# Patient Record
Sex: MALE | Race: BLACK OR AFRICAN AMERICAN | Employment: FULL TIME | URBAN - METROPOLITAN AREA
[De-identification: names, ages, dates, MRNs, and addresses within clinical notes are randomized per-mention and may not be internally consistent; named-entity substitution may affect disease eponyms.]

---

## 2017-09-29 ENCOUNTER — OFFICE VISIT (OUTPATIENT)
Dept: INTERNAL MEDICINE CLINIC | Age: 27
End: 2017-09-29

## 2017-09-29 VITALS
SYSTOLIC BLOOD PRESSURE: 120 MMHG | OXYGEN SATURATION: 97 % | DIASTOLIC BLOOD PRESSURE: 75 MMHG | RESPIRATION RATE: 16 BRPM | HEART RATE: 80 BPM | BODY MASS INDEX: 24.37 KG/M2 | TEMPERATURE: 98 F | WEIGHT: 155.3 LBS | HEIGHT: 67 IN

## 2017-09-29 DIAGNOSIS — Z00.00 PHYSICAL EXAM: Primary | ICD-10-CM

## 2017-09-29 DIAGNOSIS — F90.9 ATTENTION DEFICIT HYPERACTIVITY DISORDER (ADHD), UNSPECIFIED ADHD TYPE: ICD-10-CM

## 2017-09-29 NOTE — PROGRESS NOTES
Chief Complaint   Patient presents with    Complete Physical     (RM 9)    LOW BACK PAIN    Exposure to STD

## 2017-09-29 NOTE — MR AVS SNAPSHOT
Visit Information Date & Time Provider Department Dept. Phone Encounter #  
 9/29/2017 12:15 PM Harvey Cabrera MD [facility] [phone] [id number] Upcoming Health Maintenance Date Due Pneumococcal 19-64 Medium Risk (1 of 1 - PPSV23) 11/12/2010 DTaP/Tdap/Td series (1 - Tdap) 11/12/2012 Allergies as of 9/29/2017  Review Complete On: 9/29/2017 By: Bhanu Raya No Known Allergies Current Immunizations  Never Reviewed No immunizations on file. Not reviewed this visit You Were Diagnosed With   
  
 Codes Comments Physical exam    -  Primary ICD-10-CM: Z00.00 ICD-9-CM: V70.9 Attention deficit hyperactivity disorder (ADHD), unspecified ADHD type     ICD-10-CM: F90.9 ICD-9-CM: 314.01 Vitals BP Pulse Temp Resp Height(growth percentile) Weight(growth percentile) 120/75 80 98 °F (36.7 °C) (Oral) 16 5' 7\" (1.702 m) 155 lb 4.8 oz (70.4 kg) SpO2 BMI Smoking Status 97% 24.32 kg/m2 Current Every Day Smoker BMI and BSA Data Body Mass Index Body Surface Area  
 24.32 kg/m 2 1.82 m 2 Preferred Pharmacy Pharmacy Name Phone [facility] [address] [place] [address]. [address] [phone] Your Updated Medication List  
  
   
This list is accurate as of: 9/29/17  3:07 PM.  Always use your most recent med list.  
  
  
  
  
 ADDERALL 20 mg tablet Generic drug:  dextroamphetamine-amphetamine Take 20 mg by mouth three (3) times daily. XANAX 2 mg tablet Generic drug:  ALPRAZolam  
Take 2 mg by mouth daily. We Performed the Following AMB POC EKG ROUTINE W/ 12 LEADS, INTER & REP [29973 CPT(R)] CBC WITH AUTOMATED DIFF [21065 CPT(R)] HEPATITIS PANEL, ACUTE [89193 CPT(R)] HIV 1/2 AG/AB, 4TH GENERATION,W RFLX CONFIRM [EAD21806 Custom] LIPID PANEL [23580 CPT(R)] METABOLIC PANEL, COMPREHENSIVE [39001 CPT(R)] MT COLLECTION VENOUS BLOOD,VENIPUNCTURE [id number] CPT(R)] URINALYSIS W/ RFLX MICROSCOPIC [43052 CPT(R)] Introducing [facility]! [employer] introduces [employer] patient portal. Now you can access parts of your medical record, email your doctor's office, and request medication refills online. 1. In your internet browser, go to [email or web address] 2. Click on the First Time User? Click Here link in the Sign In box. You will see the New Member Sign Up page. 3. Enter your Sailthru Access Code exactly as it appears below. You will not need to use this code after youve completed the sign-up process. If you do not sign up before the expiration date, you must request a new code. · Sailthru Access Code: [email or web address] Expires: 12/28/2017  3:07 PM 
 
4. Enter the last four digits of your Social Security Number (xxxx) and Date of Birth (mm/dd/yyyy) as indicated and click Submit. You will be taken to the next sign-up page. 5. Create a Sailthru ID. This will be your Sailthru login ID and cannot be changed, so think of one that is secure and easy to remember. 6. Create a Sailthru password. You can change your password at any time. 7. Enter your Password Reset Question and Answer. This can be used at a later time if you forget your password. 8. Enter your e-mail address. You will receive e-mail notification when new information is available in [address] 9. Click Sign Up. You can now view and download portions of your medical record. 10. Click the Download Summary menu link to download a portable copy of your medical information. If you have questions, please visit the Frequently Asked Questions section of the Sailthru website. Remember, Sailthru is NOT to be used for urgent needs. For medical emergencies, dial 911. Now available from your iPhone and Android! Please provide this summary of care documentation to your next provider. Your primary care clinician is listed as Johny Altamirano. If you have any questions after today's visit, please call [phone].

## 2017-09-29 NOTE — PROGRESS NOTES
[address] [facility]  [place] [address]  [address] [facility] [address]  Phone:  [phone]  Fax: [phone]       Chief Complaint   Patient presents with    Complete Physical          LOW BACK PAIN    Exposure to STD   . SUBJECTIVE:    Ranulfo Montiel is a 23 y.o. male Comes in for his yearly physical primarily for boxing. He offers no complaints. He does have attention deficit disorder, but has not been taking any medications for this because he is not in school for this semester. Current Outpatient Prescriptions   Medication Sig Dispense Refill    ALPRAZolam (XANAX) 2 mg tablet Take 2 mg by mouth daily.  dextroamphetamine-amphetamine (ADDERALL) 20 mg tablet Take 20 mg by mouth three (3) times daily. Past Medical History:   Diagnosis Date    ADHD (attention deficit hyperactivity disorder)      History reviewed. No pertinent surgical history.   No Known Allergies      REVIEW OF SYSTEMS:  General: negative for - chills or fever  ENT: negative for - headaches, nasal congestion or tinnitus  Respiratory: negative for - cough, hemoptysis, shortness of breath or wheezing  Cardiovascular : negative for - chest pain, edema, palpitations or shortness of breath  Gastrointestinal: negative for - abdominal pain, blood in stools, heartburn or nausea/vomiting  Genito-Urinary: no dysuria, trouble voiding, or hematuria  Musculoskeletal: negative for - gait disturbance, joint pain, joint stiffness or joint swelling  Neurological: no TIA or stroke symptoms  Hematologic: no bruises, no bleeding, no swollen glands  Integument: no lumps, mole changes, nail changes or rash  Endocrine: no malaise/lethargy or unexpected weight changes      Social History     Social History    Marital status: SINGLE     Spouse name: N/A    Number of children: N/A    Years of education: N/A     Social History Main Topics    Smoking status: Current Every Day Smoker     Packs/day: 0.50 Years: 1.00    Smokeless tobacco: Never Used    Alcohol use No    Drug use: No    Sexual activity: Yes     Partners: Female     Other Topics Concern    None     Social History Narrative     Family History   Problem Relation Age of Onset    Diabetes Maternal Grandmother        OBJECTIVE:    Visit Vitals    /75    Pulse 80    Temp 98 °F (36.7 °C) (Oral)    Resp 16    Ht 5' 7\" (1.702 m)    Wt 155 lb 4.8 oz (70.4 kg)    SpO2 97%    BMI 24.32 kg/m2     CONSTITUTIONAL: well , well nourished, appears age appropriate  EYES: perrla, eom intact  ENMT:moist mucous membranes, pharynx clear  NECK: supple. Thyroid normal  RESPIRATORY: Chest: clear to ascultation and percussion   CARDIOVASCULAR: Heart: regular rate and rhythm  GASTROINTESTINAL: Abdomen: soft, bowel sounds active  HEMATOLOGIC: no pathological lymph nodes palpated  MUSCULOSKELETAL: Extremities: no edema, pulse 1+   INTEGUMENT: No unusual rashes or suspicious skin lesions noted. Nails appear normal.  NEUROLOGIC: non-focal exam   MENTAL STATUS: alert and oriented, appropriate affect  Genitalia: Normal male and no hernias      ASSESSMENT:  1. Physical exam    2. Attention deficit hyperactivity disorder (ADHD), unspecified ADHD type        PLAN:    1. The patient is quite healthy. 2. He is not on Adderall for now. 3. His paperwork will be completed once lab results have returned. .  Orders Placed This Encounter    CBC WITH AUTOMATED DIFF    LIPID PANEL    METABOLIC PANEL, COMPREHENSIVE    URINALYSIS W/ RFLX MICROSCOPIC    HIV 1/2 AG/AB, 4TH GENERATION,W RFLX CONFIRM    HEPATITIS PANEL, ACUTE    AMB POC EKG ROUTINE W/ 12 LEADS, INTER & REP         Follow-up Disposition:  Return in about 1 year (around 9/29/2018).       Katheryn Johnson MD

## 2017-09-30 LAB
ALBUMIN SERPL-MCNC: 4.9 G/DL (ref 3.5–5.5)
ALBUMIN/GLOB SERPL: 2 {RATIO} (ref 1.2–2.2)
ALP SERPL-CCNC: 49 IU/L (ref 39–117)
ALT SERPL-CCNC: 14 IU/L (ref 0–44)
APPEARANCE UR: CLEAR
AST SERPL-CCNC: 25 IU/L (ref 0–40)
BASOPHILS # BLD AUTO: 0 X10E3/UL (ref 0–0.2)
BASOPHILS NFR BLD AUTO: 0 %
BILIRUB SERPL-MCNC: 0.7 MG/DL (ref 0–1.2)
BILIRUB UR QL STRIP: NEGATIVE
BUN SERPL-MCNC: 19 MG/DL (ref 6–20)
BUN/CREAT SERPL: 19 (ref 9–20)
CALCIUM SERPL-MCNC: 9.5 MG/DL (ref 8.7–10.2)
CHLORIDE SERPL-SCNC: 105 MMOL/L (ref 96–106)
CHOLEST SERPL-MCNC: 163 MG/DL (ref 100–199)
CO2 SERPL-SCNC: 20 MMOL/L (ref 18–29)
COLOR UR: YELLOW
CREAT SERPL-MCNC: 1.01 MG/DL (ref 0.76–1.27)
EOSINOPHIL # BLD AUTO: 0.5 X10E3/UL (ref 0–0.4)
EOSINOPHIL NFR BLD AUTO: 8 %
ERYTHROCYTE [DISTWIDTH] IN BLOOD BY AUTOMATED COUNT: 13.5 % (ref 12.3–15.4)
GLOBULIN SER CALC-MCNC: 2.5 G/DL (ref 1.5–4.5)
GLUCOSE SERPL-MCNC: 76 MG/DL (ref 65–99)
GLUCOSE UR QL: NEGATIVE
HAV IGM SERPL QL IA: NEGATIVE
HBV CORE IGM SERPL QL IA: NEGATIVE
HBV SURFACE AG SERPL QL IA: NEGATIVE
HCT VFR BLD AUTO: 39.2 % (ref 37.5–51)
HCV AB S/CO SERPL IA: <0.1 S/CO RATIO (ref 0–0.9)
HDLC SERPL-MCNC: 72 MG/DL
HGB BLD-MCNC: 12.7 G/DL (ref 12.6–17.7)
HGB UR QL STRIP: NEGATIVE
HIV 1+2 AB+HIV1 P24 AG SERPL QL IA: NON REACTIVE
IMM GRANULOCYTES # BLD: 0 X10E3/UL (ref 0–0.1)
IMM GRANULOCYTES NFR BLD: 0 %
KETONES UR QL STRIP: NEGATIVE
LDLC SERPL CALC-MCNC: 82 MG/DL (ref 0–99)
LEUKOCYTE ESTERASE UR QL STRIP: NEGATIVE
LYMPHOCYTES # BLD AUTO: 1.8 X10E3/UL (ref 0.7–3.1)
LYMPHOCYTES NFR BLD AUTO: 29 %
MCH RBC QN AUTO: 29.7 PG (ref 26.6–33)
MCHC RBC AUTO-ENTMCNC: 32.4 G/DL (ref 31.5–35.7)
MCV RBC AUTO: 92 FL (ref 79–97)
MICRO URNS: ABNORMAL
MONOCYTES # BLD AUTO: 0.8 X10E3/UL (ref 0.1–0.9)
MONOCYTES NFR BLD AUTO: 12 %
NEUTROPHILS # BLD AUTO: 3.1 X10E3/UL (ref 1.4–7)
NEUTROPHILS NFR BLD AUTO: 51 %
NITRITE UR QL STRIP: NEGATIVE
PH UR STRIP: 6 [PH] (ref 5–7.5)
PLATELET # BLD AUTO: 347 X10E3/UL (ref 150–379)
POTASSIUM SERPL-SCNC: 4.6 MMOL/L (ref 3.5–5.2)
PROT SERPL-MCNC: 7.4 G/DL (ref 6–8.5)
PROT UR QL STRIP: NEGATIVE
RBC # BLD AUTO: 4.28 X10E6/UL (ref 4.14–5.8)
SODIUM SERPL-SCNC: 143 MMOL/L (ref 134–144)
SP GR UR: >=1.03 (ref 1–1.03)
TRIGL SERPL-MCNC: 46 MG/DL (ref 0–149)
UROBILINOGEN UR STRIP-MCNC: 0.2 MG/DL (ref 0.2–1)
VLDLC SERPL CALC-MCNC: 9 MG/DL (ref 5–40)
WBC # BLD AUTO: 6.2 X10E3/UL (ref 3.4–10.8)

## 2017-10-01 NOTE — PROGRESS NOTES
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

## 2017-10-03 ENCOUNTER — TELEPHONE (OUTPATIENT)
Dept: INTERNAL MEDICINE CLINIC | Age: 27
End: 2017-10-03

## 2017-10-03 NOTE — TELEPHONE ENCOUNTER
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

## 2017-12-14 ENCOUNTER — OFFICE VISIT (OUTPATIENT)
Dept: INTERNAL MEDICINE CLINIC | Age: 27
End: 2017-12-14

## 2017-12-14 VITALS
HEART RATE: 73 BPM | OXYGEN SATURATION: 96 % | HEIGHT: 67 IN | SYSTOLIC BLOOD PRESSURE: 112 MMHG | TEMPERATURE: 98.3 F | RESPIRATION RATE: 20 BRPM | WEIGHT: 156.7 LBS | BODY MASS INDEX: 24.6 KG/M2 | DIASTOLIC BLOOD PRESSURE: 73 MMHG

## 2017-12-14 DIAGNOSIS — T14.8XXA BRUISE: ICD-10-CM

## 2017-12-14 DIAGNOSIS — K52.9 GASTROENTERITIS: Primary | ICD-10-CM

## 2017-12-14 NOTE — PROGRESS NOTES
Chief Complaint   Patient presents with    Neck Injury     patient states that he boxes and thinks that he may have injured his neck. he also states that he thinks that he may have gotten food poisoning the next day. 1. Have you been to the ER, urgent care clinic since your last visit? Hospitalized since your last visit? No    2. Have you seen or consulted any other health care providers outside of the [address] [place] [address] since your last visit? Include any pap smears or colon screening.  No

## 2017-12-14 NOTE — MR AVS SNAPSHOT
Visit Information Date & Time Provider Department Dept. Phone Encounter #  
 12/14/2017  3:45 PM Christine Castaneda [facility] Sports Medicine and Primary Care [phone] [id number] Your Appointments 12/28/2017  9:00 AM  
Any with Romie Whiting MD  
[address] [place] [address] and Primary [facility] Appt Note: 2 week follow up  
 Danny Gomez [address] [facility]  
  
   
 Danny [address] [id number] Upcoming Health Maintenance Date Due Pneumococcal 19-64 Medium Risk (1 of 1 - PPSV23) 11/12/2010 DTaP/Tdap/Td series (1 - Tdap) 11/12/2012 Allergies as of 12/14/2017  Review Complete On: 12/14/2017 By: Penny Barber No Known Allergies Current Immunizations  Never Reviewed No immunizations on file. Not reviewed this visit Vitals BP Pulse Temp Resp Height(growth percentile) Weight(growth percentile) 112/73 (BP 1 Location: Left arm, BP Patient Position: Sitting) 73 98.3 °F (36.8 °C) (Oral) 20 5' 7\" (1.702 m) 156 lb 11.2 oz (71.1 kg) SpO2 BMI Smoking Status 96% 24.54 kg/m2 Current Every Day Smoker Vitals History BMI and BSA Data Body Mass Index Body Surface Area 24.54 kg/m 2 1.83 m 2 Preferred Pharmacy Pharmacy Name Phone [facility] [address] [facility] [phone] Your Updated Medication List  
  
   
This list is accurate as of: 12/14/17  4:34 PM.  Always use your most recent med list.  
  
  
  
  
 ADDERALL 20 mg tablet Generic drug:  dextroamphetamine-amphetamine Take 20 mg by mouth three (3) times daily. XANAX 2 mg tablet Generic drug:  ALPRAZolam  
Take 2 mg by mouth daily. Introducing [facility]!    
 [employer] introduces [employer] patient portal. Now you can access parts of your medical record, email your doctor's office, and request medication refills online. 1. In your internet browser, go to [email or web address] 2. Click on the First Time User? Click Here link in the Sign In box. You will see the New Member Sign Up page. 3. Enter your [employer] Access Code exactly as it appears below. You will not need to use this code after youve completed the sign-up process. If you do not sign up before the expiration date, you must request a new code. · [employer] Access Code: [email or web address] Expires: 12/28/2017  2:07 PM 
 
4. Enter the last four digits of your Social Security Number (xxxx) and Date of Birth (mm/dd/yyyy) as indicated and click Submit. You will be taken to the next sign-up page. 5. Create a [employer] ID. This will be your [employer] login ID and cannot be changed, so think of one that is secure and easy to remember. 6. Create a [employer] password. You can change your password at any time. 7. Enter your Password Reset Question and Answer. This can be used at a later time if you forget your password. 8. Enter your e-mail address. You will receive e-mail notification when new information is available in [address] 9. Click Sign Up. You can now view and download portions of your medical record. 10. Click the Download Summary menu link to download a portable copy of your medical information. If you have questions, please visit the Frequently Asked Questions section of the [employer] website. Remember, [employer] is NOT to be used for urgent needs. For medical emergencies, dial 911. Now available from your iPhone and Android! Please provide this summary of care documentation to your next provider. Your primary care clinician is listed as Mc De Dios. If you have any questions after today's visit, please call [phone].

## 2017-12-16 PROBLEM — K52.9 GASTROENTERITIS: Status: ACTIVE | Noted: 2017-12-16

## 2017-12-16 PROBLEM — T14.8XXA BRUISE: Status: ACTIVE | Noted: 2017-12-16

## 2017-12-16 NOTE — PROGRESS NOTES
Chief Complaint   Patient presents with    Neck Injury     patient states that he boxes and thinks that he may have injured his neck. he also states that he thinks that he may have gotten food poisoning the next day. .      SUBECTIVE:    Wilbert Trinidad is a 33 y.o. male   Comes in stating that about 3-4 days ago he ate [employer] food that was possibly contaminated with shrimp. Within about six hours after doing so, he vomited on several occasions and had diarrhea. These symptoms have now improved significantly. He recalls a similar episode occurring about 4-6 months ago when he did have shrimp formally with an identical set of symptoms occurring. He also has pain in his right flank region which is where he was struck in his boxing match. This is improving. MedDATA/gwo         Current Outpatient Prescriptions   Medication Sig Dispense Refill    ALPRAZolam (XANAX) 2 mg tablet Take 2 mg by mouth daily.  dextroamphetamine-amphetamine (ADDERALL) 20 mg tablet Take 20 mg by mouth three (3) times daily. Past Medical History:   Diagnosis Date    ADHD (attention deficit hyperactivity disorder)      History reviewed. No pertinent surgical history.   No Known Allergies    REVIEW OF SYSTEMS:  Review of Systems - Negative except   ENT ROS: negative for - headaches, hearing change, nasal congestion, oral lesions, tinnitus, visual changes or   Respiratory ROS: no cough, shortness of breath, or wheezing  Cardiovascular ROS: no chest pain or dyspnea on exertion  Gastrointestinal ROS: no abdominal pain, change in bowel habits, or black or blood  Genito-Urinary ROS: no dysuria, trouble voiding, or hematuria  Musculoskeletal ROS: negative  Neurological ROS: no TIA or stroke symptoms      Social History     Social History    Marital status: SINGLE     Spouse name: N/A    Number of children: N/A    Years of education: N/A     Social History Main Topics    Smoking status: Current Every Day Smoker Packs/day: 0.50     Years: 1.00    Smokeless tobacco: Never Used    Alcohol use No    Drug use: No    Sexual activity: Yes     Partners: Female     Other Topics Concern    None     Social History Narrative   r  Family History   Problem Relation Age of Onset    Diabetes Maternal Grandmother        OBJECTIVE:  Visit Vitals    /73 (BP 1 Location: Left arm, BP Patient Position: Sitting)    Pulse 73    Temp 98.3 °F (36.8 °C) (Oral)    Resp 20    Ht 5' 7\" (1.702 m)    Wt 156 lb 11.2 oz (71.1 kg)    SpO2 96%    BMI 24.54 kg/m2     ENT: perrla,  eom intact  NECK: supple. Thyroid normal, no JVD  CHEST: clear to ascultation and percussion   HEART: regular rate and rhythm  ABD: soft, bowel sounds active,   EXTREMITIES: no edema, pulse 1+  INTEGUMENT: clear      ASSESSMENT:  1. Gastroenteritis    2. Bruise        PLAN:    Probably had food poisoning or contamination with the most likely culprit being shrimp. He will actively avoid this. No further treatment is indicated. His bruised ribs are improving. I explained to him this has absolutely nothing to do with his kidneys. MedDATA/gwo         Follow-up Disposition:  Return if symptoms worsen or fail to improve.       Anamika Vasquez MD

## 2022-02-28 ENCOUNTER — HOSPITAL ENCOUNTER (INPATIENT)
Age: 32
LOS: 5 days | Discharge: HOME | DRG: 753 | End: 2022-03-07
Attending: EMERGENCY MEDICINE | Admitting: PSYCHIATRY & NEUROLOGY
Payer: MEDICAID

## 2022-02-28 DIAGNOSIS — R45.1 AGITATION: Primary | ICD-10-CM

## 2022-02-28 DIAGNOSIS — R45.850 HOMICIDAL IDEATION: ICD-10-CM

## 2022-02-28 LAB
ALBUMIN SERPL-MCNC: 4.2 G/DL (ref 3.5–5)
ALBUMIN/GLOB SERPL: 1.2 {RATIO} (ref 1.1–2.2)
ALP SERPL-CCNC: 52 U/L (ref 45–117)
ALT SERPL-CCNC: 25 U/L (ref 12–78)
ANION GAP SERPL CALC-SCNC: 4 MMOL/L (ref 5–15)
APAP SERPL-MCNC: <2 UG/ML (ref 10–30)
AST SERPL-CCNC: 20 U/L (ref 15–37)
BASOPHILS # BLD: 0 K/UL (ref 0–0.1)
BASOPHILS NFR BLD: 1 % (ref 0–1)
BILIRUB SERPL-MCNC: 1.7 MG/DL (ref 0.2–1)
BUN SERPL-MCNC: 15 MG/DL (ref 6–20)
BUN/CREAT SERPL: 14 (ref 12–20)
CALCIUM SERPL-MCNC: 9.5 MG/DL (ref 8.5–10.1)
CHLORIDE SERPL-SCNC: 107 MMOL/L (ref 97–108)
CO2 SERPL-SCNC: 26 MMOL/L (ref 21–32)
CREAT SERPL-MCNC: 1.1 MG/DL (ref 0.7–1.3)
DIFFERENTIAL METHOD BLD: ABNORMAL
EOSINOPHIL # BLD: 0.4 K/UL (ref 0–0.4)
EOSINOPHIL NFR BLD: 6 % (ref 0–7)
ERYTHROCYTE [DISTWIDTH] IN BLOOD BY AUTOMATED COUNT: 13.9 % (ref 11.5–14.5)
ETHANOL SERPL-MCNC: <10 MG/DL
FLUAV RNA SPEC QL NAA+PROBE: NOT DETECTED
FLUBV RNA SPEC QL NAA+PROBE: NOT DETECTED
GLOBULIN SER CALC-MCNC: 3.4 G/DL (ref 2–4)
GLUCOSE SERPL-MCNC: 87 MG/DL (ref 65–100)
HCT VFR BLD AUTO: 37.5 % (ref 36.6–50.3)
HGB BLD-MCNC: 12.1 G/DL (ref 12.1–17)
IMM GRANULOCYTES # BLD AUTO: 0 K/UL (ref 0–0.04)
IMM GRANULOCYTES NFR BLD AUTO: 0 % (ref 0–0.5)
LYMPHOCYTES # BLD: 2.2 K/UL (ref 0.8–3.5)
LYMPHOCYTES NFR BLD: 29 % (ref 12–49)
MCH RBC QN AUTO: 30.7 PG (ref 26–34)
MCHC RBC AUTO-ENTMCNC: 32.3 G/DL (ref 30–36.5)
MCV RBC AUTO: 95.2 FL (ref 80–99)
MONOCYTES # BLD: 0.8 K/UL (ref 0–1)
MONOCYTES NFR BLD: 10 % (ref 5–13)
NEUTS SEG # BLD: 4.1 K/UL (ref 1.8–8)
NEUTS SEG NFR BLD: 54 % (ref 32–75)
NRBC # BLD: 0 K/UL (ref 0–0.01)
NRBC BLD-RTO: 0 PER 100 WBC
PLATELET # BLD AUTO: 307 K/UL (ref 150–400)
PMV BLD AUTO: 9.4 FL (ref 8.9–12.9)
POTASSIUM SERPL-SCNC: 3.9 MMOL/L (ref 3.5–5.1)
PROT SERPL-MCNC: 7.6 G/DL (ref 6.4–8.2)
RBC # BLD AUTO: 3.94 M/UL (ref 4.1–5.7)
SALICYLATES SERPL-MCNC: <1.7 MG/DL (ref 2.8–20)
SARS-COV-2, COV2: NOT DETECTED
SODIUM SERPL-SCNC: 137 MMOL/L (ref 136–145)
WBC # BLD AUTO: 7.6 K/UL (ref 4.1–11.1)

## 2022-02-28 PROCEDURE — 96372 THER/PROPH/DIAG INJ SC/IM: CPT

## 2022-02-28 PROCEDURE — 80179 DRUG ASSAY SALICYLATE: CPT

## 2022-02-28 PROCEDURE — 99285 EMERGENCY DEPT VISIT HI MDM: CPT

## 2022-02-28 PROCEDURE — 85025 COMPLETE CBC W/AUTO DIFF WBC: CPT

## 2022-02-28 PROCEDURE — 80143 DRUG ASSAY ACETAMINOPHEN: CPT

## 2022-02-28 PROCEDURE — 87636 SARSCOV2 & INF A&B AMP PRB: CPT

## 2022-02-28 PROCEDURE — 82077 ASSAY SPEC XCP UR&BREATH IA: CPT

## 2022-02-28 PROCEDURE — 80053 COMPREHEN METABOLIC PANEL: CPT

## 2022-02-28 RX ORDER — HALOPERIDOL 5 MG/ML
5 INJECTION INTRAMUSCULAR
Status: COMPLETED | OUTPATIENT
Start: 2022-02-28 | End: 2022-03-01

## 2022-02-28 RX ORDER — DIPHENHYDRAMINE HYDROCHLORIDE 50 MG/ML
25 INJECTION, SOLUTION INTRAMUSCULAR; INTRAVENOUS
Status: COMPLETED | OUTPATIENT
Start: 2022-02-28 | End: 2022-03-01

## 2022-02-28 RX ORDER — LORAZEPAM 2 MG/ML
2 INJECTION INTRAMUSCULAR ONCE
Status: COMPLETED | OUTPATIENT
Start: 2022-02-28 | End: 2022-03-01

## 2022-02-28 NOTE — ED PROVIDER NOTES
this is a 32-year-old male with ADHD. He states that he was [facility] earlier today and he was sent here for further evaluation by psychiatry because he had threatened to shoot somebody. He denies any suicidal ideation but does reiterate homicidal motion. He denies using any drugs or smoking. He states he has been dry with alcohol for the last 2 months. He says the only medicines he takes for his attention disorder. He is concerned that he may withdraw from these medicines if he is not taking them. Denies any fevers chills sweats other GI or  symptoms. No cough or congestion. Past Medical History:   Diagnosis Date    ADHD (attention deficit hyperactivity disorder)        No past surgical history on file. Family History:   Problem Relation Age of Onset    Diabetes Maternal Grandmother        Social History     Socioeconomic History    Marital status: SINGLE     Spouse name: Not on file    Number of children: Not on file    Years of education: Not on file    Highest education level: Not on file   Occupational History    Not on file   Tobacco Use    Smoking status: Current Every Day Smoker     Packs/day: 0.50     Years: 1.00     Pack years: 0.50    Smokeless tobacco: Never Used   Substance and Sexual Activity    Alcohol use: No    Drug use: No    Sexual activity: Yes     Partners: Female   Other Topics Concern    Not on file   Social History Narrative    Not on file     Social Determinants of Health     Financial Resource Strain:     Difficulty of Paying Living Expenses: Not on file   Food Insecurity:     Worried About Running Out of Food in the Last Year: Not on file    Chris of Food in the Last Year: Not on file   Transportation Needs:     Lack of Transportation (Medical): Not on file    Lack of Transportation (Non-Medical):  Not on file   Physical Activity:     Days of Exercise per Week: Not on file    Minutes of Exercise per Session: Not on file   Stress:     Feeling of Stress : Not on file   Social Connections:     Frequency of Communication with Friends and Family: Not on file    Frequency of Social Gatherings with Friends and Family: Not on file    Attends Uats[employer] Services: Not on file    Active Member of Clubs or Organizations: Not on file    Attends Club or Organization Meetings: Not on file    Marital Status: Not on file   Intimate Partner Violence:     Fear of Current or Ex-Partner: Not on file    Emotionally Abused: Not on file    Physically Abused: Not on file    Sexually Abused: Not on file   Housing Stability:     Unable to Pay for Housing in the Last Year: Not on file    Number of [place] in the Last Year: Not on file    Unstable Housing in the Last Year: Not on file         ALLERGIES: Patient has no active allergies. Review of Systems   Constitutional: Negative for activity change, appetite change and fatigue. HENT: Negative for ear pain, facial swelling, sore throat and trouble swallowing. Eyes: Negative for pain, discharge and visual disturbance. Respiratory: Negative for chest tightness, shortness of breath and wheezing. Cardiovascular: Negative for chest pain and palpitations. Gastrointestinal: Negative for abdominal pain, blood in stool, nausea and vomiting. Genitourinary: Negative for difficulty urinating, flank pain and hematuria. Musculoskeletal: Negative for arthralgias, joint swelling, myalgias and neck pain. Skin: Negative for color change and rash. Neurological: Negative for dizziness, weakness, numbness and headaches. Hematological: Negative for adenopathy. Does not bruise/bleed easily. Psychiatric/Behavioral: Positive for agitation and sleep disturbance. Negative for behavioral problems and confusion. Homicidal ideation  Some agitation  Not sleeping   All other systems reviewed and are negative.       Vitals:    02/28/22 1758   BP: (!) 139/94   Pulse: 76   Resp: 20   Temp: 97.4 °F (36.3 °C) SpO2: 100%   Weight: 80 kg (176 lb 5.9 oz)   Height: 5' 7\" (1.702 m)            Physical Exam  Vitals and nursing note reviewed. Constitutional:       General: He is not in acute distress. Appearance: He is well-developed. HENT:      Head: Normocephalic and atraumatic. Nose: Nose normal.   Eyes:      General: No scleral icterus. Conjunctiva/sclera: Conjunctivae normal.      Pupils: Pupils are equal, round, and reactive to light. Neck:      Thyroid: No thyromegaly. Vascular: No JVD. Trachea: No tracheal deviation. Comments: No carotid bruits noted. Cardiovascular:      Rate and Rhythm: Normal rate and regular rhythm. Heart sounds: Normal heart sounds. No murmur heard. No friction rub. No gallop. Pulmonary:      Effort: Pulmonary effort is normal. No respiratory distress. Breath sounds: Normal breath sounds. No wheezing or rales. Chest:      Chest wall: No tenderness. Abdominal:      General: Bowel sounds are normal. There is no distension. Palpations: Abdomen is soft. There is no mass. Tenderness: There is no abdominal tenderness. There is no guarding or rebound. Musculoskeletal:         General: No tenderness. Normal range of motion. Cervical back: Normal range of motion and neck supple. Lymphadenopathy:      Cervical: No cervical adenopathy. Skin:     General: Skin is warm and dry. Findings: No erythema or rash. Neurological:      Mental Status: He is alert and oriented to person, place, and time. Cranial Nerves: No cranial nerve deficit. Coordination: Coordination normal.      Deep Tendon Reflexes: Reflexes are normal and symmetric. Psychiatric:      Comments: Some agitation but cooperative.           MDM  Number of Diagnoses or Management Options     Amount and/or Complexity of Data Reviewed  Clinical lab tests: ordered and reviewed  Decide to obtain previous medical records or to obtain history from someone other than the patient: yes  Review and summarize past medical records: yes  Discuss the patient with other providers: yes    Risk of Complications, Morbidity, and/or Mortality  Presenting problems: high  Diagnostic procedures: high  Management options: high    Patient Progress  Patient progress: stable    ED Course as of 02/28/22 1957 Mon Feb 28, 2022 1952 7:52 PM  Marietta Richards is a 28 y.o. male  awaiting placement in a psychiatric facility with a diagnosis of homicidal ideation. The patient was reexamined and remains clinically stable. All needs are being met at this time. All questions from the patient and/ or family were answered. The patient will continue to be reassessed intermittently until transfer. Jacky Skiff, MD [IO]      ED Course User Index  [IO] Karl Richey MD       Procedures    This is a 78-year-old male who presents here from [facility] where he was expressing some suicidal and/or homicidal ideation by history. The patient here says that he only has homicidal ideation. He is somewhat cooperative but agitated. He is cooperative. Admission labs have been ordered and a consult with [facility] has been ordered as well. The patient is apparently said that he is not suicidal but he is homicidal in his attention was placed on everyone in this ER. He had initially been fairly reasonable talking with a counselor. He is concerned about getting his medication. His case has been escalated to police and to TDO evaluation by the [facility]. The counselor would like to hold on medication until the patient is evaluated by the [facility] for TDO. That procedure is in process currently. 7:57 PM patient apparently has not wanted labs obtained. We are waiting until patient is evaluated for E CO by the [facility]. An E CO the patient was obtained. Labs were obtained. Patient was cleared medically for admission to psychiatry. He has been medically cleared.

## 2022-02-28 NOTE — ED TRIAGE NOTES
Patient is coming in from EMS for [address] for suicidal and homicidal ideations. Patient's back was placed in nursing station and bicycle was placed in  room 3 with patient sticker on.

## 2022-03-01 LAB
AMPHET UR QL SCN: NEGATIVE
APPEARANCE UR: CLEAR
BACTERIA URNS QL MICRO: NEGATIVE /HPF
BARBITURATES UR QL SCN: NEGATIVE
BENZODIAZ UR QL: NEGATIVE
BILIRUB UR QL: NEGATIVE
CANNABINOIDS UR QL SCN: NEGATIVE
COCAINE UR QL SCN: NEGATIVE
COLOR UR: ABNORMAL
DRUG SCRN COMMENT,DRGCM: NORMAL
EPITH CASTS URNS QL MICRO: ABNORMAL /LPF
GLUCOSE UR STRIP.AUTO-MCNC: NEGATIVE MG/DL
HGB UR QL STRIP: NEGATIVE
HYALINE CASTS URNS QL MICRO: ABNORMAL /LPF (ref 0–5)
KETONES UR QL STRIP.AUTO: ABNORMAL MG/DL
LEUKOCYTE ESTERASE UR QL STRIP.AUTO: ABNORMAL
METHADONE UR QL: NEGATIVE
NITRITE UR QL STRIP.AUTO: NEGATIVE
OPIATES UR QL: NEGATIVE
PCP UR QL: NEGATIVE
PH UR STRIP: 6.5 [PH] (ref 5–8)
PROT UR STRIP-MCNC: NEGATIVE MG/DL
RBC #/AREA URNS HPF: ABNORMAL /HPF (ref 0–5)
SP GR UR REFRACTOMETRY: 1.03 (ref 1–1.03)
UR CULT HOLD, URHOLD: NORMAL
UROBILINOGEN UR QL STRIP.AUTO: 1 EU/DL (ref 0.2–1)
WBC URNS QL MICRO: ABNORMAL /HPF (ref 0–4)

## 2022-03-01 PROCEDURE — 81001 URINALYSIS AUTO W/SCOPE: CPT

## 2022-03-01 PROCEDURE — 80307 DRUG TEST PRSMV CHEM ANLYZR: CPT

## 2022-03-01 PROCEDURE — 74011250636 HC RX REV CODE- 250/636: Performed by: EMERGENCY MEDICINE

## 2022-03-01 RX ADMIN — HALOPERIDOL LACTATE 5 MG: 5 INJECTION, SOLUTION INTRAMUSCULAR at 00:10

## 2022-03-01 RX ADMIN — LORAZEPAM 2 MG: 2 INJECTION INTRAMUSCULAR; INTRAVENOUS at 00:10

## 2022-03-01 RX ADMIN — DIPHENHYDRAMINE HYDROCHLORIDE 25 MG: 50 INJECTION INTRAMUSCULAR; INTRAVENOUS at 00:10

## 2022-03-01 NOTE — ED NOTES
Patient well-appearing, no acute distress, answering questions appropriately. Patient agreeable to provide urine sample. Police at bedside. Awaiting placement, TDO.

## 2022-03-01 NOTE — BSMART NOTE
Comprehensive Assessment Form Part 1      Section I - Disposition    Axis I - Bipolar Disorder (by hx per pt)      R/O Polysubstance Abuse  Axis II - deferred  Axis III -   No current facility-administered medications on file prior to encounter. Current Outpatient Medications on File Prior to Encounter   Medication Sig Dispense Refill    ALPRAZolam (XANAX) 2 mg tablet Take 2 mg by mouth daily.  dextroamphetamine-amphetamine (ADDERALL) 20 mg tablet Take 20 mg by mouth three (3) times daily. The Medical Doctor to Psychiatrist conference was not completed. The Medical Doctor is in agreement with Psychiatrist disposition because of (reason) pt meeting admission criteria. The plan is medically clear an present to THE [facility] for TDO evaluation. The on-call Psychiatrist consulted was Dr. Jose Cortez. The admitting Psychiatrist will be Dr. Jose E Valverde. The admitting Diagnosis is Bipolar Disorder. The Payor source is self pay. Based on the [place] Suicide Severity Risk Level  Scale there is unknown  risk for suicide-unknown as not completed at this time by nursing. Based on this assessment the overall risk of suicide is LOW. The plan will be have assessed by THE [facility]. Section II - Integrated Summary  Summary:  Per triage, \"Patient is coming in from EMS for [address] for suicidal and homicidal ideations. Patient's back was placed in nursing station and bicycle was placed in  room 3 with patient sticker on. \"    Pt upon coming into ER had verbal altercation with staff about his bike and back pack that was smoothed out by HPD. Pt presented as bizarre with hoodie tied tight under his chin and dark glasses that covered his eyes. Pt was A&Ox4 (he knew where he was and wanted meds). Pt was elevated and manic like. Pt presented early on as if he lacked capacity to consent.  Pt reported he is here to get his meds (Klonpin and something else he could not remember) refilled since he has been off of them for x1 day. Pt shared he has a psychiatrist/Dr. Nila Nesbitt either here in [address] [place] [address] or in [facility] where he said he lived. Pt shared he had been dx with Bipolar Disorder with one past psychiatric admission 1 year ago. Pt denied SI but he endorsed HI towards, \"everybody. \" Pt reported he is eating and sleeping fine. Pt denied etoh/drug use but refused UDS. Pt mentioned \"dirty  and FBI\" throughout his assessment. Pt was labile vacillating from angry to happy. Pt stated he just wanted to be medicated several times. Pt shared he came from [facility] to [address] [place] [address] \"they\" in which him and then took his money. Pt shared that he has family locally and they are who told him to come here but he has been staying in a hotel the last 2 days. Pt has an ankle bracelet on and he said it is for a DUI. Pt then began to go off on tangent when writer asked him if he was willing to come into hospital and he said, \"Keep filling filling I can achieve wall I have to leave. \" Pt then stated if he did not get medications and discharge he was going to \"fuck up\" the ER. At this time pt was placed under ECO as he escalated being referred to THE [facility]. The patienthas not demonstrated mental capacity to provide informed consent. The information is given by the patient. The Chief Complaint is HI towards \"everybody. \"  The Precipitant Factors are out of \"medications\"  Previous Hospitalizations: yes/ 1 year ago  The patient has not previously been in restraints. Current Psychiatrist and/or  is Dr. Nila Nesbitt. Lethality Assessment:    The potential for suicide not noted. The potential for homicide is not noted. The patient has not been a perpetrator of sexual or physical abuse. There are pending charges-DUI/wearing ankle bracelet. The patient is felt to be at risk for self harm or harm to others. The attending nurse was advised that security has been notified.     Section III - Psychosocial  The patient's overall mood and attitude is irritable and manic. Feelings of helplessness and hopelessness are not observed. Generalized anxiety is not observed. Panic is not observed. Phobias are not observed. Obsessive compulsive tendencies are not observed. Section IV - Mental Status Exam  The patient's appearance is unkempt. The patient's behavior is agitated, is guarded, is manic , shows poor impulse control and shows poor eye contact. The patient is oriented to time/unknown, place/hospital, person/self and situation/lalo to get meds. The patient's speech is impoverished. The patient's mood is hostile, is irritable and is expansive. The range of affect is constricted. The patient's thought content demonstrates grandiosity and paranoia. The thought process shows loose associations, shows a flight of ideas and is tangential.  The patient's perception demonstrated no changes. The patient's memory is impaired. The patient's appetite shows no evidence of impairment. The patient's sleep shows no evidence of impairment. The patient shows no insight. The patient's judgement is psychologically impaired. Section V - Substance Abuse  The patient is not using substances. However, pt refused to prove urine for UDS. Section VI - Living Arrangements  The patient is single. The patient lives alone. The patient has no children. The patient does not plan to return home upon discharge. The patient does have legal issues pending. The patient's source of income comes from employment. Congregational and cultural practices have not been voiced at this time. The patient's greatest support comes from mother/Zainab ([phone]) and this person will not be involved with the treatment. The patient has not been in an event described as horrible or outside the realm of ordinary life experience either currently or in the past.  The patient has not been a victim of sexual/physical abuse.     Section VII - Other Areas of Clinical Concern  The highest grade achieved is HS diploma with the overall quality of school experience being described as unknown. The patient is currently employed and speaks [place] as a primary language. The patient has no communication impairments affecting communication. The patient's preference for learning can be described as: can read and write adequately.   The patient's hearing is normal.  The patient's vision is normal.      Damion Beth MS, Resident in Counseling

## 2022-03-01 NOTE — ED NOTES
3:10 PM  Change of shift. Care of patient signed over to Payal. Bedside handoff complete. Awaiting psychiatric placement.

## 2022-03-01 NOTE — ED NOTES
Pt currently sleeping on stretcher. Respirations even and unlabored. No distress noted. HPD and 1:1 sitter remain at bedside.

## 2022-03-01 NOTE — ED NOTES
3:49 PM  Kari Tee is a 28 y.o. male  awaiting placement in a psychiatric facility with a diagnosis of   1. Agitation    2. Homicidal ideation    . The patient was reexamined and remains clinically stable. All needs are being met at this time. All questions from the patient and/ or family were answered. The patient will continue to be reassessed intermittently until transfer.       Patient Vitals for the past 8 hrs:   Temp Pulse Resp BP SpO2   03/01/22 1000 97.9 °F (36.6 °C) 76 16 105/68 97 %         Benjamin Pacheco MD

## 2022-03-02 PROBLEM — F29 PSYCHOSIS (HCC): Status: ACTIVE | Noted: 2022-03-02

## 2022-03-02 PROCEDURE — 65220000003 HC RM SEMIPRIVATE PSYCH

## 2022-03-02 PROCEDURE — 74011250637 HC RX REV CODE- 250/637: Performed by: PSYCHIATRY & NEUROLOGY

## 2022-03-02 RX ORDER — OLANZAPINE 5 MG/1
5 TABLET ORAL
Status: DISCONTINUED | OUTPATIENT
Start: 2022-03-02 | End: 2022-03-07 | Stop reason: HOSPADM

## 2022-03-02 RX ORDER — TRAZODONE HYDROCHLORIDE 50 MG/1
50 TABLET ORAL
Status: DISCONTINUED | OUTPATIENT
Start: 2022-03-02 | End: 2022-03-07 | Stop reason: HOSPADM

## 2022-03-02 RX ORDER — BENZTROPINE MESYLATE 1 MG/1
1 TABLET ORAL
Status: DISCONTINUED | OUTPATIENT
Start: 2022-03-02 | End: 2022-03-07 | Stop reason: HOSPADM

## 2022-03-02 RX ORDER — DIPHENHYDRAMINE HYDROCHLORIDE 50 MG/ML
50 INJECTION, SOLUTION INTRAMUSCULAR; INTRAVENOUS
Status: DISCONTINUED | OUTPATIENT
Start: 2022-03-02 | End: 2022-03-07 | Stop reason: HOSPADM

## 2022-03-02 RX ORDER — LORAZEPAM 2 MG/ML
1 INJECTION INTRAMUSCULAR
Status: DISCONTINUED | OUTPATIENT
Start: 2022-03-02 | End: 2022-03-07 | Stop reason: HOSPADM

## 2022-03-02 RX ORDER — ACETAMINOPHEN 325 MG/1
650 TABLET ORAL
Status: DISCONTINUED | OUTPATIENT
Start: 2022-03-02 | End: 2022-03-07 | Stop reason: HOSPADM

## 2022-03-02 RX ORDER — HYDROXYZINE 50 MG/1
50 TABLET, FILM COATED ORAL
Status: DISCONTINUED | OUTPATIENT
Start: 2022-03-02 | End: 2022-03-03

## 2022-03-02 RX ORDER — HALOPERIDOL 5 MG/ML
5 INJECTION INTRAMUSCULAR
Status: DISCONTINUED | OUTPATIENT
Start: 2022-03-02 | End: 2022-03-07 | Stop reason: HOSPADM

## 2022-03-02 RX ORDER — ADHESIVE BANDAGE
30 BANDAGE TOPICAL DAILY PRN
Status: DISCONTINUED | OUTPATIENT
Start: 2022-03-02 | End: 2022-03-07 | Stop reason: HOSPADM

## 2022-03-02 RX ADMIN — TRAZODONE HYDROCHLORIDE 50 MG: 50 TABLET ORAL at 20:37

## 2022-03-02 RX ADMIN — HYDROXYZINE HYDROCHLORIDE 50 MG: 50 TABLET, FILM COATED ORAL at 20:37

## 2022-03-02 NOTE — ED NOTES
7:08 AM  Change of shift. Care of patient taken over from Dr Crawford Clock; H&P reviewed, bedside handoff complete. Awaiting Bed placement;      Pt admitted by psychiatry

## 2022-03-02 NOTE — ED NOTES
[facility] psych unit reports they will have a discharge later today and will have a room for Mr Vasu Torres and will update ER staff when it is ready. They have also notified [facility].

## 2022-03-02 NOTE — ED NOTES
Pt moved to room 6, HPD and sitter at bedside. Pt awaiting bed placement for HI ideations. Bike that patient brought in with him left in room 3 with patient sticker affixed. Pt has no needs except wants to know when he will be placed somewhere.

## 2022-03-02 NOTE — ROUTINE PROCESS
TRANSFER - IN REPORT:    Verbal report received from Indira Kelly RN(name) on Janis Woodard  being received from [facility] [place] [facility](unit) for routine progression of care      Report consisted of patients Situation, Background, Assessment and   Recommendations(SBAR). Information from the following report(s) SBAR was reviewed with the receiving nurse. Opportunity for questions and clarification was provided. Assessment completed upon patients arrival to unit and care assumed.

## 2022-03-02 NOTE — ED NOTES
Report received from previous shift. Patient remains in bed with sitting at bedside. HPD outside of room. Awaiting placement. Will continue to monitor.

## 2022-03-02 NOTE — ED NOTES
10:40 PM  Change of shift. Care of patient taken over from Dr. Perez Age; H&P reviewed, bedside handoff complete. Awaiting psychiatric disposition. 10:40 PM  Niranjan Cain is a 28 y.o. male  awaiting placement in a psychiatric facility with a diagnosis of   1. Agitation    2. Homicidal ideation    . The patient was reexamined and remains clinically stable. All needs are being met at this time. All questions from the patient and/ or family were answered. The patient will continue to be reassessed intermittently until transfer. Patient Vitals for the past 8 hrs:   Temp Pulse Resp BP SpO2   03/02/22 0505 98.4 °F (36.9 °C) 65 15 125/68 98 %         Cherelle Zacarias MD    6:58 AM  Change of shift. Care of patient signed over to Dr Markel Arana . Bedside handoff complete. Awaiting placement in a psychiatric inpatient facility.

## 2022-03-02 NOTE — ED NOTES
TRANSFER - OUT REPORT:    Verbal report given to Claudia(name) on [facility]  being transferred to (unit) for routine progression of care       Report consisted of patients Situation, Background, Assessment and   Recommendations(SBAR). Information from the following report(s) SBAR, Kardex, ED Summary and MAR was reviewed with the receiving nurse. Lines:       Opportunity for questions and clarification was provided.       Patient transported with:   Registered Nurse and security

## 2022-03-03 PROCEDURE — 74011250637 HC RX REV CODE- 250/637: Performed by: NURSE PRACTITIONER

## 2022-03-03 PROCEDURE — 74011250637 HC RX REV CODE- 250/637: Performed by: PSYCHIATRY & NEUROLOGY

## 2022-03-03 PROCEDURE — 65220000003 HC RM SEMIPRIVATE PSYCH

## 2022-03-03 RX ORDER — HYDROXYZINE 50 MG/1
50 TABLET, FILM COATED ORAL
Status: DISCONTINUED | OUTPATIENT
Start: 2022-03-03 | End: 2022-03-07 | Stop reason: HOSPADM

## 2022-03-03 RX ORDER — DIVALPROEX SODIUM 500 MG/1
1000 TABLET, EXTENDED RELEASE ORAL
Status: DISCONTINUED | OUTPATIENT
Start: 2022-03-03 | End: 2022-03-07 | Stop reason: HOSPADM

## 2022-03-03 RX ADMIN — TRAZODONE HYDROCHLORIDE 50 MG: 50 TABLET ORAL at 22:13

## 2022-03-03 RX ADMIN — DIVALPROEX SODIUM 1000 MG: 500 TABLET, EXTENDED RELEASE ORAL at 22:11

## 2022-03-03 NOTE — BH NOTES
PRN Medication Documentation    Specific patient behavior that led to need for PRN medication: Patient request medication for anxiety and insomnia  Staff interventions attempted prior to PRN being given: ***  PRN medication given: ***  Patient response/effectiveness of PRN medication: ***

## 2022-03-03 NOTE — BH NOTES
PSYCHOSOCIAL ASSESSMENT  :Patient identifying info:   Olive Cogan is a 28 y.o., male admitted 2/28/2022  5:51 PM     Presenting problem and precipitating factors: Pt arrived  to the ED for mental health evaluation with cc of SI and HI towards everybody. Pt reports he has Hx of Bipolar Disorder with past hospitalization 1 year ago. Per pt's mom report (TDO) pt has been diagnosed with Schizophrenia. Pt had an ankle bracelet on and reported it is due to a DUI. Pt was demanding to receive medications in the ED since he reported he had not taken his prescribed medications for 1 day. Pt presented with irritable mood, persecutory paranoic, and manic at the ED. Pt denied substance abuse but reports alcohol issues. Mental status assessment: \"I'm better\". Pt was A&O x4. Pt appeared euphoric at times and presented with persecutory paranoia. Pt denies SI, reports HI towards law enforcement. Pt reports he knows people are out to get him. Strengths/Recreation/Coping Skills: Employed. Collateral information:   Noe Deleon (mom) [phone] (SIGNED MARIIA)    Current psychiatric /substance abuse providers and contact info:   Dr. Juliet Gibson    Previous psychiatric/substance abuse providers and response to treatment:   1 year ago, [facility]. Family history of mental illness or substance abuse:   Brother, schizoaffective disorder. Substance abuse history:    Social History     Tobacco Use    Smoking status: Former Smoker     Packs/day: 0.50     Years: 1.00     Pack years: 0.50    Smokeless tobacco: Never Used   Substance Use Topics    Alcohol use: No       History of biomedical complications associated with substance abuse: None reported. Patient's current acceptance of treatment or motivation for change: TDO    Family constellation: Single, no children. Is significant other involved? no    Describe support system: Family support.     Describe living arrangements and home environment: Pt lives alone and reports he is currently homeless. GUARDIAN/POA: NO    Guardian Name: NA    Guardian Contact: NA    Health issues:   Hospital Problems  Date Reviewed: 2017          Codes Class Noted POA    Psychosis ([facility] ICD-10-CM: W85  ICD-9-CM: 298.9  3/2/2022 Unknown              Trauma history: None reported. Legal issues: Ankle bracelet for DUI. History of  service: None reported. Financial status: Employed ([id number] [employer]). Scientology/cultural factors: None reported. Education/work history: HS diploma. Have you been licensed as a health care professional (current or ): Not reported. Leisure and recreation preferences: None reported. Describe coping skills:Limited, Ineffective.     CHUN Jimenes Intern  3/3/2022

## 2022-03-03 NOTE — BH NOTES
Admission Status: TDO   Hearing set up for 3/3/22 @ 0730    Transfer from [facility] ED    UDS:neg  BAL:<10    Admitting dx: Psychosis    Reason for admission: Pt presented to [facility] ED on 2/28/22 with agitation. Pt was bizzare threatening, and manic. Pt states he's from [place], [place]. Patient has had 2 DUI's one in [place] and the most recent one in [place]. Pt is wearing an ETOH monitor on L ankle. Pt has previous inpatient admissions. With last visit x 2 years ago at [facility]. Pt has bike stored in ECT room.      Primary Nurse Kumar Ron RN and Lyn Wagner RN performed a dual skin assessment on this patient No impairment noted  Gavin score is 23

## 2022-03-03 NOTE — INTERDISCIPLINARY ROUNDS
Behavioral Health Interdisciplinary Rounds     Patient Name: Marla Mora  Age: 28 y.o. Room/Bed:  730/  Primary Diagnosis: <principal problem not specified>   Admission Status: TDO     Readmission within 30 days: no  Power of  in place: no  Patient requires a blocked bed: no             VTE Prophylaxis: Not indicated    Mobility needs/Fall risk: no  Flu Vaccine : no   Nutritional Plan: no  Consults: none         Labs/Testing due today?: no    Sleep hours: 8       Participation in Care/Groups:  yes  Medication Compliant?: Yes  PRNS (last 24 hours): Antianxiety and Sleep Aid    Restraints (last 24 hours):  no     CIWA (range last 24 hours): CIWA-Ar Total: 0   COWS (range last 24 hours):      Alcohol screening (AUDIT) completed -         If applicable, date SBIRT discussed in treatment team AND documented:   AUDIT Screen Score:        Document Brief Intervention (corresponds directly with the 5 A's, Ask, Advise, Assess, Assist, and Arrange): At- Risk Patients (Score 7-15 for women; 8-15 for men)  Discuss concern patient is drinking at unhealthy levels known to increase risk of alcohol-related health problems. Is Patient ready to commit to change? If No:   Encourage reflection   Discuss short term and long term health risks of consuming alcohol   Barriers to change   Reaffirm willingness to help / Educational materials provided  If Yes:   Set goal  PaySimple provided    Harmful use or Dependence (Score 16 or greater)   Discuss short term and long term health risks of consuming alcohol   Recommendations   Negotiate drinking goal   Recommend addiction specialist/center   Arrange follow-up appointments.     Tobacco - patient is a smoker: Have You Used Tobacco in the Past 30 Days: No  Illegal Drugs use: Have You Used Any Illegal Substances Over the Past 12 Months: No    24 hour chart check complete: yes ____________________________________________________________________________________________________________    Patient goal(s) for today:   Treatment team focus/goals:   Progress note     LOS:  1  Expected LOS:     Financial concerns/prescription coverage:    Family contact:       Family requesting physician contact today:    Discharge plan:   Access to weapons :         Outpatient provider(s):   Patient's preferred phone number for follow up call :   Patient's preferred e-mail address :  Participating treatment team members: Desiree Blum, * (assigned SW),

## 2022-03-03 NOTE — BH NOTES
GROUP THERAPY PROGRESS NOTE    No group due to low patient participation. SW left activity to complete independently.   CHUN Dooley, QMHP-A

## 2022-03-03 NOTE — PROGRESS NOTES
Patient received resting quietly in bed. NAD. Meal and medication compliant. Remains safe on unit. Will continue to monitor with q15min rounds and provide support. Problem: Manic Behavior (Adult/Pediatric)  Goal: *STG: Participates in treatment plan  Outcome: Progressing Towards Goal  Goal: *STG: Demonstrates improvement in thought process and speech pattern  Outcome: Progressing Towards Goal  Goal: *STG: Demonstrates improvement in sleep pattern  Outcome: Progressing Towards Goal  Goal: *STG: Seeks staff when feelings of anxiety and fear arise  Outcome: Progressing Towards Goal  Goal: *STG: Remains safe in hospital  Outcome: Progressing Towards Goal     Problem: Falls - Risk of  Goal: *Absence of Falls  Description: Document Martell Fall Risk and appropriate interventions in the flowsheet.   3/3/2022 1519 by Christine Garcia RN  Outcome: Progressing Towards Goal  Note: Fall Risk Interventions:            Medication Interventions: Teach patient to arise slowly                3/3/2022 0614 by Christine Garcia RN  Outcome: Progressing Towards Goal  Note: Fall Risk Interventions:            Medication Interventions: Teach patient to arise slowly

## 2022-03-03 NOTE — BH NOTES
GROUP THERAPY PROGRESS NOTE    Patient did not participate in psychotherapy group.     Lencho Short MSW, QMHP-A

## 2022-03-03 NOTE — PROGRESS NOTES
[facility]  Master Treatment Plan Gaetano Ladd        Date Treatment Plan Initiated: 3/3/2022      Treatment Plan Modalities:    Type of Modality Amount  (x minutes) Frequency (x/week) Duration (x days) Name of Responsible Staff   Community & wrap-up meetings to encourage peer interactions    15    7    1     Vish ARZOLA   Group psychotherapy to assist in building coping skills and internal controls    60    7    1    Luther Bautista LCSW   Therapeutic activity groups to build coping skills    60    7    1    Luther Bautista LCSW   Psychoeducation in group setting to address:   Medication education    15    7    1    ANH Villalba   Coping skills    20    7    1    Jamila Sykes RN   Relaxation techniques        [facility]   Symptom management        staff   Discharge planning    15    7    1    Je Esteves,    Spirituality     60    7    1    [address] [address]    60    7    1    Volunteer from [employer]/AA/NA    61    7    1    Volunteer from [address] [place] [address] medication management    15    7    1    Dr. Ethel Tracey NP   Family meeting/discharge planning                         Involuntary Committal to [facility] [place]    Problem: Manic Behavior (Adult/Pediatric)  Goal: *STG: Demonstrates improvement in thought process and speech pattern  Outcome: Not Progressing Towards Goal  Appears to be somewhat paranoid     Goal: *STG: Demonstrates improvement in sleep pattern  Outcome: Progressing Towards Goal  sleep is reported as\" not a problem. \"  Goal: *STG: Seeks staff when feelings of anxiety and fear arise  Outcome: Not Progressing Towards Goal  Tends to be dismissive of staff  Goal: *STG: Remains safe in hospital  Outcome: Progressing Towards Goal  Pt denies any suicidal or homicidal thoughts. Contracts for safety. Remains on q 15 min safety checks.

## 2022-03-03 NOTE — PROGRESS NOTES
Patient received resting in bed with eyes closed. NAD and no complaints noted. Safety measures in place. Will continue to monitor with q15min rounds. Problem: Falls - Risk of  Goal: *Absence of Falls  Description: Document Dennie Oak Fall Risk and appropriate interventions in the flowsheet.   Outcome: Progressing Towards Goal  Note: Fall Risk Interventions:            Medication Interventions: Teach patient to arise slowly

## 2022-03-04 PROCEDURE — 65220000003 HC RM SEMIPRIVATE PSYCH

## 2022-03-04 PROCEDURE — 74011250637 HC RX REV CODE- 250/637: Performed by: PSYCHIATRY & NEUROLOGY

## 2022-03-04 PROCEDURE — 74011250637 HC RX REV CODE- 250/637: Performed by: NURSE PRACTITIONER

## 2022-03-04 RX ADMIN — TRAZODONE HYDROCHLORIDE 50 MG: 50 TABLET ORAL at 20:29

## 2022-03-04 RX ADMIN — DIVALPROEX SODIUM 1000 MG: 500 TABLET, EXTENDED RELEASE ORAL at 20:29

## 2022-03-04 RX ADMIN — HYDROXYZINE HYDROCHLORIDE 50 MG: 50 TABLET ORAL at 12:39

## 2022-03-04 RX ADMIN — HYDROXYZINE HYDROCHLORIDE 50 MG: 50 TABLET ORAL at 08:13

## 2022-03-04 RX ADMIN — OLANZAPINE 5 MG: 5 TABLET, FILM COATED ORAL at 12:37

## 2022-03-04 NOTE — PROGRESS NOTES
Problem: Manic Behavior (Adult/Pediatric)  Goal: *STG: Remains safe in hospital  Outcome: Progressing Towards Goal  Goal: *STG/LTG: Complies with medication therapy  Outcome: Progressing Towards Goal

## 2022-03-04 NOTE — BH NOTES
6465- pt requests anxiety and sleep medication. Education and coping skills provided. Po 50 mg Atarax administered. Pt is alert oriented visible on the unit. 1240- PRN Medication Documentation    Specific patient behavior that led to need for PRN medication: pt is labile, threatening, reports increased anxiety and internal agitation. Staff interventions attempted prior to PRN being given: education, therapeutic communication, coping skills    PRN medication given: po 50 atarax, po 5 mg Zyprexa   Patient response/effectiveness of PRN medication: pt is visible on the unit. 1355- pt requests anxiety medication. Pt is demanding and hostile at times. Education provided and coping skills are encouraged. Pt is using coping skills.

## 2022-03-04 NOTE — PROGRESS NOTES
Admission Medication Reconciliation:    Information obtained from:  patient interview, Insurance claims data, and [facility]  RxQuery data available¹:  NO    Comments/Recommendations: Updated PTA meds/reviewed patient's allergies. 1)  Spoke with the patient on 3/3 afternoon. He reports that he is prescribed clonazepam, divalproex, and unknown non-stimulant for ADHD. He states that he sees a provider at the local [facility] in [place], [place]. He states that he gets his medications from a [facility] in [place], [place]. PMPs were run (see below) and it does not appear that the clonazepam is current. He shared that his insurance has his  wrong (they have 91). VA/[place]  was checked with that  as well - no records. Attempted to call [facility] but never was able to speak to a pharmacy representative (called #3 different [facility] in [place], [place]). 2)  The [facility] Prescription Monitoring Program () was assessed to determine fill history of any controlled medications. The patient was filling clonazepam 0.5 mg #30/30ds monthly 3/2021-7020 and last on 21 for clonazepam 0.5 mg #7/7ds. 3)  Medication changes (since last review): Added  - divalproex - per pt report, unable to confirm with pharmacy    Removed  - alprazolam, dextroamphetamine/amphetamine   ¹RxQuery pharmacy benefit data reflects medications filled and processed through the patient's insurance, however this data does NOT capture whether the medication was picked up or is currently being taken by the patient. Allergies:  Patient has no known allergies.     Significant PMH/Disease States:   Past Medical History:   Diagnosis Date    ADHD (attention deficit hyperactivity disorder)      Chief Complaint for this Admission:    Chief Complaint   Patient presents with    Mental Health Problem     Prior to Admission Medications:   Prior to Admission Medications   Prescriptions Last Dose Informant Taking?   divalproex sodium (DIVALPROEX PO)   Yes      Facility-Administered Medications: None     Malik Levy, PHARMD

## 2022-03-04 NOTE — INTERDISCIPLINARY ROUNDS
Behavioral Health Interdisciplinary Rounds     Patient Name: Rhonda Barakat  Age: 28 y.o. Room/Bed:  730/  Primary Diagnosis: <principal problem not specified>   Admission Status: Involuntary Commitment     Readmission within 30 days: no  Power of  in place: no  Patient requires a blocked bed: no          Reason for blocked bed:     VTE Prophylaxis: Not indicated    Mobility needs/Fall risk: no  Flu Vaccine : no   Nutritional Plan: no  Consults:          Labs/Testing due today?: no    Sleep hours:  7      Participation in Care/Groups:  no  Medication Compliant?: Yes  PRNS (last 24 hours): Sleep Aid    Restraints (last 24 hours):  no     CIWA (range last 24 hours): CIWA-Ar Total: 0   COWS (range last 24 hours):      Alcohol screening (AUDIT) completed -         If applicable, date SBIRT discussed in treatment team AND documented:   AUDIT Screen Score:        Document Brief Intervention (corresponds directly with the 5 A's, Ask, Advise, Assess, Assist, and Arrange): At- Risk Patients (Score 7-15 for women; 8-15 for men)  Discuss concern patient is drinking at unhealthy levels known to increase risk of alcohol-related health problems. Is Patient ready to commit to change? If No:   Encourage reflection   Discuss short term and long term health risks of consuming alcohol   Barriers to change   Reaffirm willingness to help / Educational materials provided  If Yes:   Set goal  [facility] provided    Harmful use or Dependence (Score 16 or greater)   Discuss short term and long term health risks of consuming alcohol   Recommendations   Negotiate drinking goal   Recommend addiction specialist/center   Arrange follow-up appointments.     Tobacco - patient is a smoker: Have You Used Tobacco in the Past 30 Days: No  Illegal Drugs use: Have You Used Any Illegal Substances Over the Past 12 Months: No    24 hour chart check complete: yes ____________________________________________________________________________________________________________    Patient goal(s) for today: communicate needs to staff  Treatment team focus/goals: med mgmt and treatment goals  Progress note   Pt presents with irritable mood, poor insight, loose thoughts, pressured speech. Infers the hospital should be working on filing his disability paperwork and findng him housing. Requests MSW contact his PO, Alexandre Partida, in [place], [place]. MSW DARIEL Gutiérrez will be back in office, Monday. MSW discussed pt's admission and discharge options with pt's mother, Ekta Altman. Ekta Altman was unable to speak freely stating she will contact this writer, Monday. She confirms pt has nowhere to live in [place] and she is unsure if he will be going back to [facility] Probation: Alexandre Partida: [phone]    LOS:  2  Expected LOS:     Financial concerns/prescription coverage:    Family contact:    MotherTk:  [phone]  PO, [facility]: [phone]     Family requesting physician contact today:    Discharge plan:   Access to weapons :        Outpatient provider(s):   Patient's preferred phone number for follow up call :   Patient's preferred e-mail address :  Participating treatment team members: CHUN Corrigan; Melia Lee NP; Jeremy Grace; Buzz Lee RN

## 2022-03-04 NOTE — PROGRESS NOTES
Problem: Manic Behavior (Adult/Pediatric)  Goal: *STG: Participates in treatment plan  Outcome: Progressing Towards Goal  ATTENDS AND HAS ACTIVE PARTICIPATION  Goal: *STG: Demonstrates improvement in sleep pattern  Outcome: Progressing Towards Goal  SLEEP REPORTED AT 8 HOURS  Goal: *STG: Remains safe in hospital  Outcome: Progressing Towards Goal  Pt denies any suicidal or homicidal thoughts. Contracts for safety. Remains on q 15 min safety checks. Goal: *STG/LTG: Complies with medication therapy  Outcome: Progressing Towards Goal   MEDICATION COMPLIANT DENIES ANY ADVERSE SIDE EFFECTS.

## 2022-03-04 NOTE — PROGRESS NOTES
Patient received resting in bed with eyes closed. NAD and no complaints noted. Safety measures in place. Will continue to monitor with q15min rounds. Problem: Falls - Risk of  Goal: *Absence of Falls  Description: Document Jaime Macias Fall Risk and appropriate interventions in the flowsheet.   Outcome: Progressing Towards Goal  Note: Fall Risk Interventions:            Medication Interventions: Teach patient to arise slowly

## 2022-03-04 NOTE — H&P
[address] [facility] HISTORY AND PHYSICAL    Name:  Debby Caban  MR#:  [id number]  :  1990  ACCOUNT #:  [de-identified]  ADMIT DATE:  2022      INITIAL PSYCHIATRIC EVALUATION    CHIEF COMPLAINT:  \"I want Klonopin. \"    HISTORY OF PRESENTING ILLNESS:  The patient is a 81-year-old male who was initially admitted at [address] on a temporary shelter order. He had his hearing this morning and was involuntary committed. He is notably manic during the interview. His speech is pressured and thought process is disorganized. He is also inappropriately laughing. He states that he was at [place], was homeless there and then his brother picked him up and brought him back to [place] and then they had an argument. He states that he was also discharged from a [facility] in [place] for being late on his appointment, but he states that he was not really late. He states that he was prescribed Depakote, Klonopin. He states that he stopped taking the Depakote 2 weeks ago because it made him drowsy. He has a history of bipolar I disorder and states that he was previously admitted at [facility] and other facilities. Reportedly, the patient was brought into the emergency room by the EMS after he endorsed suicidal and then homicidal ideations at [address]. Upon his arrival in the emergency room, he had a verbal altercation with one of the staff about his bike and backpack. He reported that he was in the emergency room to get his Klonopin. He tells me that he has homicidal ideation towards people. He states that he is being followed by police officers and law enforcement officers. Beula Dent are bad people. \"  His urine drug screen is negative. He is focus on klonopin. He is currently on probation for DUI. He has an ankle monitor. He denies any suicidal ideation.   He is admitted to the inpatient psychiatric unit for further stabilization and treatment. PAST MEDICAL HISTORY:  See H and P. Past Medical History:   Diagnosis Date    ADHD (attention deficit hyperactivity disorder)        Labs: (reviewed/updated 3/5/2022)  Patient Vitals for the past 8 hrs:   BP Temp Pulse Resp SpO2   03/04/22 1953 130/79 97.2 °F (36.2 °C) 94 16 100 %     Labs Reviewed   CBC WITH AUTOMATED DIFF - Abnormal; Notable for the following components:       Result Value    RBC 3.94 (*)     All other components within normal limits   METABOLIC PANEL, COMPREHENSIVE - Abnormal; Notable for the following components:    Anion gap 4 (*)     Bilirubin, total 1.7 (*)     All other components within normal limits   ACETAMINOPHEN - Abnormal; Notable for the following components:    Acetaminophen level <2 (*)     All other components within normal limits   SALICYLATE - Abnormal; Notable for the following components:    Salicylate level <8.3 (*)     All other components within normal limits   URINALYSIS W/MICROSCOPIC - Abnormal; Notable for the following components:    Ketone TRACE (*)     Leukocyte Esterase TRACE (*)     All other components within normal limits   COVID-19 WITH INFLUENZA A/B   URINE CULTURE HOLD SAMPLE   ETHYL ALCOHOL   DRUG SCREEN, URINE     Lab Results   Component Value Date/Time    Sodium 137 02/28/2022 08:58 PM    Potassium 3.9 02/28/2022 08:58 PM    Chloride 107 02/28/2022 08:58 PM    CO2 26 02/28/2022 08:58 PM    Anion gap 4 (L) 02/28/2022 08:58 PM    Glucose 87 02/28/2022 08:58 PM    BUN 15 02/28/2022 08:58 PM    Creatinine 1.10 02/28/2022 08:58 PM    BUN/Creatinine ratio 14 02/28/2022 08:58 PM    GFR est AA >60 02/28/2022 08:58 PM    GFR est non-AA >60 02/28/2022 08:58 PM    Calcium 9.5 02/28/2022 08:58 PM    Bilirubin, total 1.7 (H) 02/28/2022 08:58 PM    Alk.  phosphatase 52 02/28/2022 08:58 PM    Protein, total 7.6 02/28/2022 08:58 PM    Albumin 4.2 02/28/2022 08:58 PM    Globulin 3.4 02/28/2022 08:58 PM    A-G Ratio 1.2 02/28/2022 08:58 PM    ALT (SGPT) 25 02/28/2022 08:58 PM     Admission on 02/28/2022   Component Date Value Ref Range Status    WBC 02/28/2022 7.6  4.1 - 11.1 K/uL Final    RBC 02/28/2022 3.94* 4.10 - 5.70 M/uL Final    HGB 02/28/2022 12.1  12.1 - 17.0 g/dL Final    HCT 02/28/2022 37.5  36.6 - 50.3 % Final    MCV 02/28/2022 95.2  80.0 - 99.0 FL Final    MCH 02/28/2022 30.7  26.0 - 34.0 PG Final    MCHC 02/28/2022 32.3  30.0 - 36.5 g/dL Final    RDW 02/28/2022 13.9  11.5 - 14.5 % Final    PLATELET 18/82/2032 531  150 - 400 K/uL Final    MPV 02/28/2022 9.4  8.9 - 12.9 FL Final    NRBC 02/28/2022 0.0  0  WBC Final    ABSOLUTE NRBC 02/28/2022 0.00  0.00 - 0.01 K/uL Final    NEUTROPHILS 02/28/2022 54  32 - 75 % Final    LYMPHOCYTES 02/28/2022 29  12 - 49 % Final    MONOCYTES 02/28/2022 10  5 - 13 % Final    EOSINOPHILS 02/28/2022 6  0 - 7 % Final    BASOPHILS 02/28/2022 1  0 - 1 % Final    IMMATURE GRANULOCYTES 02/28/2022 0  0.0 - 0.5 % Final    ABS. NEUTROPHILS 02/28/2022 4.1  1.8 - 8.0 K/UL Final    ABS. LYMPHOCYTES 02/28/2022 2.2  0.8 - 3.5 K/UL Final    ABS. MONOCYTES 02/28/2022 0.8  0.0 - 1.0 K/UL Final    ABS. EOSINOPHILS 02/28/2022 0.4  0.0 - 0.4 K/UL Final    ABS. BASOPHILS 02/28/2022 0.0  0.0 - 0.1 K/UL Final    ABS. IMM.  GRANS. 02/28/2022 0.0  0.00 - 0.04 K/UL Final    DF 02/28/2022 AUTOMATED    Final    Sodium 02/28/2022 137  136 - 145 mmol/L Final    Potassium 02/28/2022 3.9  3.5 - 5.1 mmol/L Final    Chloride 02/28/2022 107  97 - 108 mmol/L Final    CO2 02/28/2022 26  21 - 32 mmol/L Final    Anion gap 02/28/2022 4* 5 - 15 mmol/L Final    Glucose 02/28/2022 87  65 - 100 mg/dL Final    BUN 02/28/2022 15  6 - 20 MG/DL Final    Creatinine 02/28/2022 1.10  0.70 - 1.30 MG/DL Final    BUN/Creatinine ratio 02/28/2022 14  12 - 20   Final    GFR est AA 02/28/2022 >60  >60 ml/min/1.73m2 Final    GFR est non-AA 02/28/2022 >60  >60 ml/min/1.73m2 Final    Calcium 02/28/2022 9.5  8.5 - 10.1 MG/DL Final    Bilirubin, total 02/28/2022 1.7* 0.2 - 1.0 MG/DL Final    ALT (SGPT) 02/28/2022 25  12 - 78 U/L Final    AST (SGOT) 02/28/2022 20  15 - 37 U/L Final    Alk. phosphatase 02/28/2022 52  45 - 117 U/L Final    Protein, total 02/28/2022 7.6  6.4 - 8.2 g/dL Final    Albumin 02/28/2022 4.2  3.5 - 5.0 g/dL Final    Globulin 02/28/2022 3.4  2.0 - 4.0 g/dL Final    A-G Ratio 02/28/2022 1.2  1.1 - 2.2   Final    Acetaminophen level 02/28/2022 <2* 10 - 30 ug/mL Final    Salicylate level 11/74/0102 <1.7* 2.8 - 20.0 MG/DL Final    ALCOHOL(ETHYL),SERUM 02/28/2022 <10  <10 MG/DL Final    SARS-CoV-2 02/28/2022 Not detected  NOTD   Final    Influenza A by PCR 02/28/2022 Not detected  NOTD   Final    Influenza B by PCR 02/28/2022 Not detected  NOTD   Final    Color 03/01/2022 YELLOW/STRAW    Final    Appearance 03/01/2022 CLEAR  CLEAR   Final    Specific gravity 03/01/2022 1.029  1.003 - 1.030   Final    pH (UA) 03/01/2022 6.5  5.0 - 8.0   Final    Protein 03/01/2022 Negative  NEG mg/dL Final    Glucose 03/01/2022 Negative  NEG mg/dL Final    Ketone 03/01/2022 TRACE* NEG mg/dL Final    Bilirubin 03/01/2022 Negative  NEG   Final    Blood 03/01/2022 Negative  NEG   Final    Urobilinogen 03/01/2022 1.0  0.2 - 1.0 EU/dL Final    Nitrites 03/01/2022 Negative  NEG   Final    Leukocyte Esterase 03/01/2022 TRACE* NEG   Final    WBC 03/01/2022 0-4  0 - 4 /hpf Final    RBC 03/01/2022 0-5  0 - 5 /hpf Final    Epithelial cells 03/01/2022 FEW  FEW /lpf Final    Bacteria 03/01/2022 Negative  NEG /hpf Final    Hyaline cast 03/01/2022 0-2  0 - 5 /lpf Final    Urine culture hold 03/01/2022 Urine on hold in Microbiology dept for 2 days. If unpreserved urine is submitted, it cannot be used for addtional testing after 24 hours, recollection will be required.     Final    AMPHETAMINES 03/01/2022 Negative  NEG   Final    BARBITURATES 03/01/2022 Negative  NEG   Final    BENZODIAZEPINES 03/01/2022 Negative NEG   Final    COCAINE 03/01/2022 Negative  NEG   Final    METHADONE 03/01/2022 Negative  NEG   Final    OPIATES 03/01/2022 Negative  NEG   Final    PCP(PHENCYCLIDINE) 03/01/2022 Negative  NEG   Final    THC (TH-CANNABINOL) 03/01/2022 Negative  NEG   Final    Drug screen comment 03/01/2022 (NOTE)   Final     Vitals:    03/03/22 1707 03/03/22 2047 03/04/22 0707 03/04/22 1953   BP: 125/66 118/69 118/69 130/79   Pulse: 72 78 62 94   Resp: 16 16 16 16   Temp: 98.1 °F (36.7 °C) 98.3 °F (36.8 °C) 97.4 °F (36.3 °C) 97.2 °F (36.2 °C)   SpO2: 100% 99% 100% 100%   Weight:       Height:         No results found for this or any previous visit (from the past 24 hour(s)). RADIOLOGY REPORTS:  No results found for this or any previous visit. No results found. PAST PSYCHIATRIC HISTORY:  See above. PSYCHOSOCIAL HISTORY:  He reports that he is single. He has no children. He finished high school. He is homeless. He is currently on probation for DUI. He has an ankle monitor. MENTAL STATUS EXAMINATION:  He is currently sitting up in chair, dressed in hospital apparel. Mood is elevated. Affect is expansive. Speech is pressured. Thought process is disorganized. He denies suicidal ideation, but he is endorsing homicidal ideation as described above. He denies any hallucinations, but paranoia is noteworthy. Memory is impaired. Intelligence is below average. Insight is poor. Judgment is poor. DIAGNOSIS:  Bipolar I disorder, current episode manic with psychotic features. TREATMENT PLANNING:  I will continue his inpatient stay. He will be provided with support and encouraged to attend groups. His safety will be monitored. His medications will be modified and assessed. Case Management will work on discharge planning. ASSETS AND STRENGTHS:  He is willing to take medications. He is willing to seek help. ESTIMATED LENGTH OF STAY:  5-7 days.         JOSÉ CUNNINGHAM, NP      [id number]  D:  03/03/2022 19:38  T:  03/03/2022 21:16  JOB #:  [id number]

## 2022-03-04 NOTE — BH NOTES
PSYCHIATRIC PROGRESS NOTE       Patient: Alexsander Salazar MRN: [id number]  SSN: [id number]    YOB: 1990  Age: 28 y.o. Sex: male      Admit Date: 2/28/2022    LOS: 2 days       Chief Complaint:  I am here for my Clonazepam.    Interval History:  Alexsander Salazar remains elevated, easily agitated, loose thought processing, pressured speech. He remains hyper focus on klonopin. He is told several times that it is habit forming and it will not be prescribed, he's also told that pharmacist tried calling and verifying klonopin but with poor results. \"I go to work, I am not stupid, I need my medication for work. \" Also wants to pursue disability, wants housing says court date is today. He is not doing well. Manic. At the present time the patient Alexsander Salazar remains compliant with taking medications. Denies any adverse events from taking them. Past Medical History:  Past Medical History:   Diagnosis Date    ADHD (attention deficit hyperactivity disorder)          ALLERGIES:(reviewed/updated 3/4/2022)  No Known Allergies    Laboratory report:  Lab Results   Component Value Date/Time    WBC 7.6 02/28/2022 08:58 PM    HGB 12.1 02/28/2022 08:58 PM    HCT 37.5 02/28/2022 08:58 PM    PLATELET 497 70/27/2415 08:58 PM    MCV 95.2 02/28/2022 08:58 PM      Lab Results   Component Value Date/Time    Sodium 137 02/28/2022 08:58 PM    Potassium 3.9 02/28/2022 08:58 PM    Chloride 107 02/28/2022 08:58 PM    CO2 26 02/28/2022 08:58 PM    Anion gap 4 (L) 02/28/2022 08:58 PM    Glucose 87 02/28/2022 08:58 PM    BUN 15 02/28/2022 08:58 PM    Creatinine 1.10 02/28/2022 08:58 PM    BUN/Creatinine ratio 14 02/28/2022 08:58 PM    GFR est AA >60 02/28/2022 08:58 PM    GFR est non-AA >60 02/28/2022 08:58 PM    Calcium 9.5 02/28/2022 08:58 PM    Bilirubin, total 1.7 (H) 02/28/2022 08:58 PM    Alk.  phosphatase 52 02/28/2022 08:58 PM    Protein, total 7.6 02/28/2022 08:58 PM    Albumin 4.2 02/28/2022 08:58 PM    Globulin 3.4 02/28/2022 08:58 PM    A-G Ratio 1.2 02/28/2022 08:58 PM    ALT (SGPT) 25 02/28/2022 08:58 PM      Vitals:    03/03/22 0759 03/03/22 1707 03/03/22 2047 03/04/22 0707   BP: 136/81 125/66 118/69 118/69   Pulse: 66 72 78 62   Resp: 16 16 16 16   Temp: 98 °F (36.7 °C) 98.1 °F (36.7 °C) 98.3 °F (36.8 °C) 97.4 °F (36.3 °C)   SpO2: 100% 100% 99% 100%   Weight:       Height:           No results found for: VALF2, VALAC, VALP, VALPR, DS6, CRBAM, CRBAMP, CARB2, XCRBAM  No results found for: LITHM    Vital Signs  Patient Vitals for the past 24 hrs:   Temp Pulse Resp BP SpO2   03/04/22 0707 97.4 °F (36.3 °C) 62 16 118/69 100 %   03/03/22 2047 98.3 °F (36.8 °C) 78 16 118/69 99 %   03/03/22 1707 98.1 °F (36.7 °C) 72 16 125/66 100 %     Wt Readings from Last 3 Encounters:   02/28/22 80 kg (176 lb 5.9 oz)   12/14/17 71.1 kg (156 lb 11.2 oz)   09/29/17 70.4 kg (155 lb 4.8 oz)     Temp Readings from Last 3 Encounters:   03/04/22 97.4 °F (36.3 °C)   12/14/17 98.3 °F (36.8 °C) (Oral)   09/29/17 98 °F (36.7 °C) (Oral)     BP Readings from Last 3 Encounters:   03/04/22 118/69   12/14/17 112/73   09/29/17 120/75     Pulse Readings from Last 3 Encounters:   03/04/22 62   12/14/17 73   09/29/17 80       Radiology (reviewed/updated 3/4/2022)  No results found.     Current Facility-Administered Medications   Medication Dose Route Frequency Provider Last Rate Last Admin    divalproex ER (DEPAKOTE ER) 24 hour tablet 1,000 mg  1,000 mg Oral QHS Queta Ivan, NP   1,000 mg at 03/03/22 2211    hydrOXYzine HCL (ATARAX) tablet 50 mg  50 mg Oral Q4H PRN Queta Ivan NP   50 mg at 03/04/22 1239    OLANZapine (ZyPREXA) tablet 5 mg  5 mg Oral Q6H PRN Nani Carvajal MD   5 mg at 03/04/22 1237    haloperidol lactate (HALDOL) injection 5 mg  5 mg IntraMUSCular Q6H PRN Nani Carvajal MD        benztropine (COGENTIN) tablet 1 mg  1 mg Oral BID PRN Nani Carvajal MD        diphenhydrAMINE (BENADRYL) injection 50 mg  50 mg IntraMUSCular BID PRN Aquilino Peña MD        LORazepam (ATIVAN) injection 1 mg  1 mg IntraMUSCular Q4H PRN Aquilino Peña MD        traZODone (DESYREL) tablet 50 mg  50 mg Oral QHS PRN Aquilino Peña MD   50 mg at 03/03/22 2213    acetaminophen (TYLENOL) tablet 650 mg  650 mg Oral Q4H PRN Aquilino Peña MD        magnesium hydroxide (MILK OF MAGNESIA) 400 mg/5 mL oral suspension 30 mL  30 mL Oral DAILY PRN Aquilino Peña MD           Side Effects: (reviewed/updated 3/4/2022)  None reported or admitted to. Review of Systems: (reviewed/updated 3/4/2022)  Appetite: good  Sleep: good   All other Review of Systems: negative    Mental Status Exam:  Eye contact: no eye contact  Psychomotor activity: increased  Speech is pressured  Thought process: Loose disorganized  Mood is elevated , agitated  Affect: expansive  Perception: No avh  Suicidal ideation: No si  Homicidal ideation: No hi  Insight/judgment: Poor  Cognition is grossly intact      Physical Exam:  Musculoskeletal system: steady gait  Tremor not present  Cog wheeling not present      Assessment and Plan:  Olive Cogan meets criteria for a diagnosis of Bipolar I disorder, current episode manic with psychotic features. Va level Monday evening. Consider risperdal.  Continue current medications as prescribed. We will closely monitor for safety. We will encourage reality orientation. Disposition planning to continue. I certify that this patients inpatient psychiatric hospital services furnished since the previous certification were, and continue to be, required for treatment that could reasonably be expected to improve the patient's condition, or for diagnostic study, and that the patient continues to need, on a daily basis, active treatment furnished directly by or requiring the supervision of inpatient psychiatric facility personnel.  In addition, the hospital records show that services furnished were intensive treatment services, admission or related services, or equivalent services.       Signed:  Ramakrishna Escalante NP  3/4/2022

## 2022-03-04 NOTE — PROGRESS NOTES
Received pt resting quietly. No acute distress noted at this time. Will continue to monitor.   Problem: Manic Behavior (Adult/Pediatric)  Goal: *STG: Remains safe in hospital  3/4/2022 1702 by Gelacio Carroll RN  Outcome: Progressing Towards Goal  3/4/2022 1701 by Gelacio Carroll RN  Outcome: Progressing Towards Goal  Goal: *STG/LTG: Complies with medication therapy  3/4/2022 1702 by Gelacio Carroll RN  Outcome: Progressing Towards Goal  3/4/2022 1701 by Gelacio Carroll RN  Outcome: Progressing Towards Goal

## 2022-03-05 PROCEDURE — 65220000003 HC RM SEMIPRIVATE PSYCH

## 2022-03-05 PROCEDURE — 74011250637 HC RX REV CODE- 250/637: Performed by: PSYCHIATRY & NEUROLOGY

## 2022-03-05 PROCEDURE — 74011250637 HC RX REV CODE- 250/637: Performed by: NURSE PRACTITIONER

## 2022-03-05 RX ORDER — RISPERIDONE 1 MG/1
1 TABLET, FILM COATED ORAL
Status: DISCONTINUED | OUTPATIENT
Start: 2022-03-05 | End: 2022-03-07 | Stop reason: HOSPADM

## 2022-03-05 RX ADMIN — TRAZODONE HYDROCHLORIDE 50 MG: 50 TABLET ORAL at 20:10

## 2022-03-05 RX ADMIN — OLANZAPINE 5 MG: 5 TABLET, FILM COATED ORAL at 20:12

## 2022-03-05 RX ADMIN — OLANZAPINE 5 MG: 5 TABLET, FILM COATED ORAL at 08:49

## 2022-03-05 RX ADMIN — HYDROXYZINE HYDROCHLORIDE 50 MG: 50 TABLET ORAL at 20:12

## 2022-03-05 RX ADMIN — HYDROXYZINE HYDROCHLORIDE 50 MG: 50 TABLET ORAL at 08:49

## 2022-03-05 RX ADMIN — DIVALPROEX SODIUM 1000 MG: 500 TABLET, EXTENDED RELEASE ORAL at 20:10

## 2022-03-05 NOTE — BH NOTES
2029:  PRN Medication Documentation    Specific patient behavior that led to need for PRN medication:  To promote sleep per patient request  Staff interventions attempted prior to PRN being given: dim lights, quiet environment  PRN medication given: Trazadone 50mg  Patient response/effectiveness of PRN medication: Will continue to monitor

## 2022-03-05 NOTE — PROGRESS NOTES
Problem: Manic Behavior (Adult/Pediatric)  Goal: *STG/LTG: Complies with medication therapy  Outcome: Progressing Towards Goal    Visible on the unit. Anxious mood, denies SI. Compliant with meals and medications.

## 2022-03-05 NOTE — BH NOTES
5468- pt reports unmanaged anxiety and increased lability, racing thoughts, paranoid thoughts, . Education provided. Coping skills encouraged. Po 50 mg Atarax and 5 mg Zyprexa administered. Pt is visible on the unit.

## 2022-03-05 NOTE — INTERDISCIPLINARY ROUNDS
Behavioral Health Interdisciplinary Rounds     Patient Name: Desiree Blum  Age: 28 y.o. Room/Bed:  728/  Primary Diagnosis: <principal problem not specified>   Admission Status: Involuntary Commitment     Readmission within 30 days: no  Power of  in place: no  Patient requires a blocked bed: no          Reason for blocked bed:     VTE Prophylaxis: Not indicated    Mobility needs/Fall risk: no  Flu Vaccine : no   Nutritional Plan: no  Consults:          Labs/Testing due today?: no    Sleep hours:  8+      Participation in Care/Groups:    Medication Compliant?: Yes  PRNS (last 24 hours): Antipsychotic (PO), Antianxiety and Sleep Aid    Restraints (last 24 hours):  no     CIWA (range last 24 hours): CIWA-Ar Total: 0   COWS (range last 24 hours):      Alcohol screening (AUDIT) completed -         If applicable, date SBIRT discussed in treatment team AND documented:   AUDIT Screen Score:        Document Brief Intervention (corresponds directly with the 5 A's, Ask, Advise, Assess, Assist, and Arrange): At- Risk Patients (Score 7-15 for women; 8-15 for men)  Discuss concern patient is drinking at unhealthy levels known to increase risk of alcohol-related health problems. Is Patient ready to commit to change? If No:   Encourage reflection   Discuss short term and long term health risks of consuming alcohol   Barriers to change   Reaffirm willingness to help / Educational materials provided  If Yes:   Set goal  [employer] provided    Harmful use or Dependence (Score 16 or greater)   Discuss short term and long term health risks of consuming alcohol   Recommendations   Negotiate drinking goal   Recommend addiction specialist/center   Arrange follow-up appointments.     Tobacco - patient is a smoker: Have You Used Tobacco in the Past 30 Days: No  Illegal Drugs use: Have You Used Any Illegal Substances Over the Past 12 Months: No    24 hour chart check complete: yes ____________________________________________________________________________________________________________    Patient goal(s) for today:   Treatment team focus/goals:   Progress note     LOS:  3  Expected LOS:     Financial concerns/prescription coverage:    Family contact:       Family requesting physician contact today:    Discharge plan:   Access to weapons :         Outpatient provider(s):   Patient's preferred phone number for follow up call :   Patient's preferred e-mail address :  Participating treatment team members: Padmaja Jose, * (assigned SW),

## 2022-03-05 NOTE — PROGRESS NOTES
Problem: Manic Behavior (Adult/Pediatric)  Goal: *STG: Participates in treatment plan  Outcome: Progressing Towards Goal     Problem: Manic Behavior (Adult/Pediatric)  Goal: *STG: Remains safe in hospital  Outcome: Progressing Towards Goal     Problem: Manic Behavior (Adult/Pediatric)  Goal: *STG/LTG: Complies with medication therapy  Outcome: Progressing Towards Goal     Problem: Manic Behavior (Adult/Pediatric)  Goal: Interventions  Outcome: Progressing Towards Goal  Note: Pt is alert and oriented. Labile mood and affect. Loud and demanding requiring education and redirection frequently. Medication and meal compliant. 2200- pt is resting in bed. Newly scheduled medication offered to pt. Education provided. Pt refuses medication. \"I am too tired. I will take it tomorrow. \"

## 2022-03-06 PROCEDURE — 74011250637 HC RX REV CODE- 250/637: Performed by: NURSE PRACTITIONER

## 2022-03-06 PROCEDURE — 74011250637 HC RX REV CODE- 250/637: Performed by: PSYCHIATRY & NEUROLOGY

## 2022-03-06 PROCEDURE — 65220000003 HC RM SEMIPRIVATE PSYCH

## 2022-03-06 RX ADMIN — DIVALPROEX SODIUM 1000 MG: 500 TABLET, EXTENDED RELEASE ORAL at 20:10

## 2022-03-06 RX ADMIN — RISPERIDONE 1 MG: 1 TABLET ORAL at 20:10

## 2022-03-06 RX ADMIN — TRAZODONE HYDROCHLORIDE 50 MG: 50 TABLET ORAL at 20:10

## 2022-03-06 RX ADMIN — OLANZAPINE 5 MG: 5 TABLET, FILM COATED ORAL at 08:33

## 2022-03-06 RX ADMIN — HYDROXYZINE HYDROCHLORIDE 50 MG: 50 TABLET ORAL at 20:10

## 2022-03-06 RX ADMIN — HYDROXYZINE HYDROCHLORIDE 50 MG: 50 TABLET ORAL at 08:33

## 2022-03-06 NOTE — PROGRESS NOTES
Problem: Manic Behavior (Adult/Pediatric)  Goal: *STG: Participates in treatment plan  3/6/2022 1558 by Rosemary Zheng  Outcome: Progressing Towards Goal     Problem: Manic Behavior (Adult/Pediatric)  Goal: *STG: Demonstrates decrease in delusional thinking  Outcome: Progressing Towards Goal     Problem: Manic Behavior (Adult/Pediatric)  Goal: *STG: Remains safe in hospital  3/6/2022 1558 by Rosemary Zheng  Outcome: Progressing Towards Goal     Problem: Manic Behavior (Adult/Pediatric)  Goal: Interventions  3/6/2022 1558 by Rosemary Zheng  Outcome: Progressing Towards Goal  Note: Pt is alert and oriented resting in bed. Pt is agreeable to taking scheduled Risperdal for c/o anxiety, agitation, lability. Pt reports PRN medications have been helpful in reducing these symptoms.

## 2022-03-06 NOTE — INTERDISCIPLINARY ROUNDS
Behavioral Health Interdisciplinary Rounds     Patient Name: Rhonda Barakat  Age: 28 y.o. Room/Bed:  728/  Primary Diagnosis: <principal problem not specified>   Admission Status: Involuntary Commitment     Readmission within 30 days: no  Power of  in place: no  Patient requires a blocked bed: no          Reason for blocked bed:     VTE Prophylaxis: Not indicated    Mobility needs/Fall risk: no  Flu Vaccine : no   Nutritional Plan: no  Consults:          Labs/Testing due today?: no    Sleep hours:  8      Participation in Care/Groups:    Medication Compliant?: Selective  PRNS (last 24 hours): Antipsychotic (PO), Antianxiety and Sleep Aid    Restraints (last 24 hours):  no     CIWA (range last 24 hours): CIWA-Ar Total: 0   COWS (range last 24 hours):      Alcohol screening (AUDIT) completed -         If applicable, date SBIRT discussed in treatment team AND documented:   AUDIT Screen Score:        Document Brief Intervention (corresponds directly with the 5 A's, Ask, Advise, Assess, Assist, and Arrange): At- Risk Patients (Score 7-15 for women; 8-15 for men)  Discuss concern patient is drinking at unhealthy levels known to increase risk of alcohol-related health problems. Is Patient ready to commit to change? If No:   Encourage reflection   Discuss short term and long term health risks of consuming alcohol   Barriers to change   Reaffirm willingness to help / Educational materials provided  If Yes:   Set goal  Hybrigenics provided    Harmful use or Dependence (Score 16 or greater)   Discuss short term and long term health risks of consuming alcohol   Recommendations   Negotiate drinking goal   Recommend addiction specialist/center   Arrange follow-up appointments.     Tobacco - patient is a smoker: Have You Used Tobacco in the Past 30 Days: No  Illegal Drugs use: Have You Used Any Illegal Substances Over the Past 12 Months: No    24 hour chart check complete: yes   ____________________________________________________________________________________________________________    Patient goal(s) for today:   Treatment team focus/goals:   Progress note     LOS:  4  Expected LOS:     Financial concerns/prescription coverage:    Family contact:       Family requesting physician contact today:    Discharge plan:   Access to weapons :         Outpatient provider(s):   Patient's preferred phone number for follow up call :   Patient's preferred e-mail address :  Participating treatment team members: Lan Walker (assigned SW),

## 2022-03-06 NOTE — PROGRESS NOTES
Problem: Manic Behavior (Adult/Pediatric)  Goal: *STG: Participates in treatment plan  Outcome: Progressing Towards Goal     Problem: Manic Behavior (Adult/Pediatric)  Goal: *STG: Demonstrates improvement in sleep pattern  Outcome: Progressing Towards Goal     Problem: Manic Behavior (Adult/Pediatric)  Goal: *STG: Remains safe in hospital  Outcome: Progressing Towards Goal     Problem: Manic Behavior (Adult/Pediatric)  Goal: Interventions  Outcome: Progressing Towards Goal  Note: Pt is alert and labile. Mood and affect are congruent. Poor boundaries; engaging in verbal altercation with roommate. pt request medication for increased anxiety, racing paranoid thoughts and lability of mood. Education provided. Coping skills are encouraged. Po 50 mg atarax and po 5 mg Zyprexa administered. Pt is alert and oriented visible on the unit attempting to use coping skills. 1010- pt remains irritable and demanding. Education and therapeutic communication provided continuously. Coping skills are encouraged.

## 2022-03-06 NOTE — BH NOTES
PSYCHIATRIC PROGRESS NOTE       Patient: Jewels Bose MRN: [id number]  SSN: [id number]    YOB: 1990  Age: [age] [address] y.o. Sex: male      Admit Date: 2/28/2022    LOS: 3 days       Chief Complaint:  What's up with my disability? Interval History:  Jewels Bose is doing poorly, remains elevated, easily agitated, loose thought processing, pressured speech. He says he is anxious, thoughts are racing, though he is less focus on klonopin today. Nursing staff report paranoia. He denies si or hi. At the present time the patient Jewels Bose remains compliant with taking medications. Denies any adverse events from taking them. Past Medical History:  Past Medical History:   Diagnosis Date    ADHD (attention deficit hyperactivity disorder)          ALLERGIES:(reviewed/updated 3/5/2022)  No Known Allergies    Laboratory report:  Lab Results   Component Value Date/Time    WBC 7.6 02/28/2022 08:58 PM    HGB 12.1 02/28/2022 08:58 PM    HCT 37.5 02/28/2022 08:58 PM    PLATELET 056 35/95/9810 08:58 PM    MCV 95.2 02/28/2022 08:58 PM      Lab Results   Component Value Date/Time    Sodium 137 02/28/2022 08:58 PM    Potassium 3.9 02/28/2022 08:58 PM    Chloride 107 02/28/2022 08:58 PM    CO2 26 02/28/2022 08:58 PM    Anion gap 4 (L) 02/28/2022 08:58 PM    Glucose 87 02/28/2022 08:58 PM    BUN 15 02/28/2022 08:58 PM    Creatinine 1.10 02/28/2022 08:58 PM    BUN/Creatinine ratio 14 02/28/2022 08:58 PM    GFR est AA >60 02/28/2022 08:58 PM    GFR est non-AA >60 02/28/2022 08:58 PM    Calcium 9.5 02/28/2022 08:58 PM    Bilirubin, total 1.7 (H) 02/28/2022 08:58 PM    Alk.  phosphatase 52 02/28/2022 08:58 PM    Protein, total 7.6 02/28/2022 08:58 PM    Albumin 4.2 02/28/2022 08:58 PM    Globulin 3.4 02/28/2022 08:58 PM    A-G Ratio 1.2 02/28/2022 08:58 PM    ALT (SGPT) 25 02/28/2022 08:58 PM      Vitals:    03/05/22 0758 03/05/22 1550 03/05/22 1639 03/05/22 1937   BP: 122/70  (!) 142/101 118/81   Pulse: 87  75 82   Resp: 16 12 16 16   Temp: 97.3 °F (36.3 °C)  97.5 °F (36.4 °C) 98.4 °F (36.9 °C)   SpO2: 100%  100% 98%   Weight:       Height:           No results found for: VALF2, VALAC, VALP, VALPR, DS6, CRBAM, CRBAMP, CARB2, XCRBAM  No results found for: LITHM    Vital Signs  Patient Vitals for the past 24 hrs:   Temp Pulse Resp BP SpO2   03/05/22 1937 98.4 °F (36.9 °C) 82 16 118/81 98 %   03/05/22 1639 97.5 °F (36.4 °C) 75 16 (!) 142/101 100 %   03/05/22 1550 -- -- 12 -- --   03/05/22 0758 97.3 °F (36.3 °C) 87 16 122/70 100 %     Wt Readings from Last 3 Encounters:   02/28/22 80 kg (176 lb 5.9 oz)   12/14/17 71.1 kg (156 lb 11.2 oz)   09/29/17 70.4 kg (155 lb 4.8 oz)     Temp Readings from Last 3 Encounters:   03/05/22 98.4 °F (36.9 °C)   12/14/17 98.3 °F (36.8 °C) (Oral)   09/29/17 98 °F (36.7 °C) (Oral)     BP Readings from Last 3 Encounters:   03/05/22 118/81   12/14/17 112/73   09/29/17 120/75     Pulse Readings from Last 3 Encounters:   03/05/22 82   12/14/17 73   09/29/17 80       Radiology (reviewed/updated 3/5/2022)  No results found.     Current Facility-Administered Medications   Medication Dose Route Frequency Provider Last Rate Last Admin    risperiDONE (RisperDAL) tablet 1 mg  1 mg Oral QHS Queta Ivan NP        divalproex ER (DEPAKOTE ER) 24 hour tablet 1,000 mg  1,000 mg Oral QHS Queta Ivan, NP   1,000 mg at 03/05/22 2010    hydrOXYzine HCL (ATARAX) tablet 50 mg  50 mg Oral Q4H PRN Queta Ivan, NP   50 mg at 03/05/22 2012    OLANZapine (ZyPREXA) tablet 5 mg  5 mg Oral Q6H PRN Calin Arita MD   5 mg at 03/05/22 2012    haloperidol lactate (HALDOL) injection 5 mg  5 mg IntraMUSCular Q6H PRN Calin Arita MD        benztropine (COGENTIN) tablet 1 mg  1 mg Oral BID PRN Calin Arita MD        diphenhydrAMINE (BENADRYL) injection 50 mg  50 mg IntraMUSCular BID PRN Calin Arita MD        LORazepam (ATIVAN) injection 1 mg  1 mg IntraMUSCular Q4H PRN Calin Arita MD  traZODone (DESYREL) tablet 50 mg  50 mg Oral QHS PRN Nikki Hamilton MD   50 mg at 03/05/22 2010    acetaminophen (TYLENOL) tablet 650 mg  650 mg Oral Q4H PRN Nikki Hamilton MD        magnesium hydroxide (MILK OF MAGNESIA) 400 mg/5 mL oral suspension 30 mL  30 mL Oral DAILY PRN Nikki Hamilton MD           Side Effects: (reviewed/updated 3/5/2022)  None reported or admitted to. Review of Systems: (reviewed/updated 3/5/2022)  Appetite: good  Sleep: good   All other Review of Systems: negative    Mental Status Exam:  Eye contact: no eye contact  Psychomotor activity: increased  Speech is pressured  Thought process: Loose disorganized  Mood is elevated , agitated  Affect: expansive  Perception: No avh  Suicidal ideation: No si  Homicidal ideation: No hi  Insight/judgment: Poor  Cognition is grossly intact      Physical Exam:  Musculoskeletal system: steady gait  Tremor not present  Cog wheeling not present      Assessment and Plan:  April Huff meets criteria for a diagnosis of Bipolar I disorder, current episode manic with psychotic features. Va level Monday evening. Start Risperdal 1 mg qhs. Continue current medications as prescribed. We will closely monitor for safety. We will encourage reality orientation. Disposition planning to continue. I certify that this patients inpatient psychiatric hospital services furnished since the previous certification were, and continue to be, required for treatment that could reasonably be expected to improve the patient's condition, or for diagnostic study, and that the patient continues to need, on a daily basis, active treatment furnished directly by or requiring the supervision of inpatient psychiatric facility personnel. In addition, the hospital records show that services furnished were intensive treatment services, admission or related services, or equivalent services.       Signed:  Jovanni Hennessy NP  3/5/2022

## 2022-03-06 NOTE — BH NOTES
2012:  PRN Medication Documentation    Specific patient behavior that led to need for PRN medication: anxious, racing thoughts, to promote sleep  Staff interventions attempted prior to PRN being given: coping skills, dim lights, quiet environment  PRN medication given: Atarax 50mg, Zyprexa 5mg, Trazadone 50mg  Patient response/effectiveness of PRN medication: Will continue to monitor

## 2022-03-06 NOTE — PROGRESS NOTES
Problem: Falls - Risk of  Goal: *Absence of Falls  Description: Document Selwyn Counts Fall Risk and appropriate interventions in the flowsheet. Outcome: Progressing Towards Goal  Note: Fall Risk Interventions:            Medication Interventions: Teach patient to arise slowly      Patient received resting quietly in bed. No signs of distress. Even and unlabored breathing. Staff will continue to monitor safety Q15 and provide support.

## 2022-03-07 VITALS
WEIGHT: 176.37 LBS | RESPIRATION RATE: 16 BRPM | HEIGHT: 67 IN | HEART RATE: 77 BPM | DIASTOLIC BLOOD PRESSURE: 70 MMHG | BODY MASS INDEX: 27.68 KG/M2 | TEMPERATURE: 98.1 F | SYSTOLIC BLOOD PRESSURE: 128 MMHG | OXYGEN SATURATION: 100 %

## 2022-03-07 LAB
CHOLEST SERPL-MCNC: 140 MG/DL
HDLC SERPL-MCNC: 59 MG/DL
HDLC SERPL: 2.4 {RATIO} (ref 0–5)
LDLC SERPL CALC-MCNC: 62 MG/DL (ref 0–100)
TRIGL SERPL-MCNC: 95 MG/DL (ref ?–150)
VALPROATE SERPL-MCNC: 74 UG/ML (ref 50–100)
VLDLC SERPL CALC-MCNC: 19 MG/DL

## 2022-03-07 PROCEDURE — 74011250637 HC RX REV CODE- 250/637: Performed by: PSYCHIATRY & NEUROLOGY

## 2022-03-07 PROCEDURE — 80061 LIPID PANEL: CPT

## 2022-03-07 PROCEDURE — 74011250637 HC RX REV CODE- 250/637: Performed by: NURSE PRACTITIONER

## 2022-03-07 PROCEDURE — 36415 COLL VENOUS BLD VENIPUNCTURE: CPT

## 2022-03-07 PROCEDURE — 80164 ASSAY DIPROPYLACETIC ACD TOT: CPT

## 2022-03-07 RX ORDER — HYDROXYZINE 50 MG/1
50 TABLET, FILM COATED ORAL
Qty: 30 TABLET | Refills: 0 | Status: SHIPPED | OUTPATIENT
Start: 2022-03-07

## 2022-03-07 RX ORDER — TRAZODONE HYDROCHLORIDE 50 MG/1
50 TABLET ORAL
Qty: 30 TABLET | Refills: 0 | Status: SHIPPED | OUTPATIENT
Start: 2022-03-07

## 2022-03-07 RX ORDER — RISPERIDONE 1 MG/1
1 TABLET, FILM COATED ORAL
Qty: 30 TABLET | Refills: 0 | Status: SHIPPED | OUTPATIENT
Start: 2022-03-07

## 2022-03-07 RX ORDER — DIVALPROEX SODIUM 500 MG/1
1000 TABLET, EXTENDED RELEASE ORAL
Qty: 60 TABLET | Refills: 0 | Status: SHIPPED | OUTPATIENT
Start: 2022-03-07

## 2022-03-07 RX ADMIN — HYDROXYZINE HYDROCHLORIDE 50 MG: 50 TABLET ORAL at 08:27

## 2022-03-07 RX ADMIN — OLANZAPINE 5 MG: 5 TABLET, FILM COATED ORAL at 08:27

## 2022-03-07 NOTE — BH NOTES
2010:  PRN Medication Documentation    Specific patient behavior that led to need for PRN medication: Anxious, to promote sleep  Staff interventions attempted prior to PRN being given: Dim lights, quiet environment, therapeutic communication  PRN medication given: Atarax 50mg, Trazadone 50mg  Patient response/effectiveness of PRN medication: Will continue to monitor. Patient appears less anxious, more calm.

## 2022-03-07 NOTE — INTERDISCIPLINARY ROUNDS
Behavioral Health Interdisciplinary Rounds     Patient Name: Desiree Blum  Age: 28 y.o. Room/Bed:  730/02  Primary Diagnosis: <principal problem not specified>   Admission Status: Involuntary Commitment     Readmission within 30 days: no  Power of  in place: no  Patient requires a blocked bed: no          Reason for blocked bed:     VTE Prophylaxis: Not indicated    Mobility needs/Fall risk: no  Flu Vaccine : no   Nutritional Plan: no  Consults:          Labs/Testing due today?: yes    Sleep hours:  7      Participation in Care/Groups:    Medication Compliant?: Yes  PRNS (last 24 hours): Antipsychotic (PO), Antianxiety and Sleep Aid    Restraints (last 24 hours):  no     CIWA (range last 24 hours): CIWA-Ar Total: 0   COWS (range last 24 hours):      Alcohol screening (AUDIT) completed -         If applicable, date SBIRT discussed in treatment team AND documented:   AUDIT Screen Score:        Document Brief Intervention (corresponds directly with the 5 A's, Ask, Advise, Assess, Assist, and Arrange): At- Risk Patients (Score 7-15 for women; 8-15 for men)  Discuss concern patient is drinking at unhealthy levels known to increase risk of alcohol-related health problems. Is Patient ready to commit to change? If No:   Encourage reflection   Discuss short term and long term health risks of consuming alcohol   Barriers to change   Reaffirm willingness to help / Educational materials provided  If Yes:   Set goal  Glance App provided    Harmful use or Dependence (Score 16 or greater)   Discuss short term and long term health risks of consuming alcohol   Recommendations   Negotiate drinking goal   Recommend addiction specialist/center   Arrange follow-up appointments.     Tobacco - patient is a smoker: Have You Used Tobacco in the Past 30 Days: No  Illegal Drugs use: Have You Used Any Illegal Substances Over the Past 12 Months: No    24 hour chart check complete: yes ____________________________________________________________________________________________________________    Patient goal(s) for today:   Treatment team focus/goals:   Progress note     LOS:  5  Expected LOS:     Financial concerns/prescription coverage:    Family contact:       Family requesting physician contact today:    Discharge plan:   Access to weapons :         Outpatient provider(s):   Patient's preferred phone number for follow up call :   Patient's preferred e-mail address :  Participating treatment team members: Tom Flower, * (assigned SW),

## 2022-03-07 NOTE — PROGRESS NOTES
Problem: Manic Behavior (Adult/Pediatric)  Goal: *STG: Demonstrates decrease in delusional thinking  Outcome: Progressing Towards Goal  Goal: *STG: Demonstrates improvement in thought process and speech pattern  Outcome: Progressing Towards Goal  Goal: *STG: Demonstrates improvement in sleep pattern  Outcome: Progressing Towards Goal    Patient is irritable, antagonistic towards peers. Denies si/hi. Medication compliant. Will discharge this afternoon. Discharge instructions gone over with patient with opportunity for questions to be asked and clarified. Personal belongings returned to patient.

## 2022-03-07 NOTE — PROGRESS NOTES
Pharmacist Discharge Medication Reconciliation    Discharging Provider: Rober Tracey NP    Significant PMH:   Past Medical History:   Diagnosis Date    ADHD (attention deficit hyperactivity disorder)      Chief Complaint for this Admission:   Chief Complaint   Patient presents with    Mental Health Problem     Allergies: Patient has no known allergies. Discharge Medications:   Current Discharge Medication List        START taking these medications    Details   hydrOXYzine HCL (ATARAX) 50 mg tablet Take 1 Tablet by mouth every four (4) hours as needed for Anxiety. Indications: anxious  Qty: 30 Tablet, Refills: 0  Start date: 3/7/2022      risperiDONE (RisperDAL) 1 mg tablet Take 1 Tablet by mouth nightly. Indications: kaela associated with bipolar disorder  Qty: 30 Tablet, Refills: 0  Start date: 3/7/2022      traZODone (DESYREL) 50 mg tablet Take 1 Tablet by mouth nightly as needed for Sleep (For insomnia). Indications: insomnia associated with depression  Qty: 30 Tablet, Refills: 0  Start date: 3/7/2022           CONTINUE these medications which have CHANGED    Details   divalproex ER (DEPAKOTE ER) 500 mg ER tablet Take 2 Tablets by mouth nightly.  Indications: kaela associated with bipolar disorder  Qty: 60 Tablet, Refills: 0  Start date: 3/7/2022           The patient's chart, MAR and AVS were reviewed by LELE StarkD.

## 2022-03-07 NOTE — BH NOTES
GROUP THERAPY PROGRESS NOTE    Patient did not participate in Leisure Education group.     CHUN Zapata, [facility]

## 2022-03-07 NOTE — PROGRESS NOTES
Laboratory Monitoring for Divalproex/Valproic Acid: This patient is currently prescribed the following medication(s):   Current Facility-Administered Medications   Medication Dose Route Frequency    risperiDONE (RisperDAL) tablet 1 mg  1 mg Oral QHS    divalproex ER (DEPAKOTE ER) 24 hour tablet 1,000 mg  1,000 mg Oral QHS     The following labs have been completed for monitoring of valproic acid:    Valproic Acid Serum Concentration  Lab Results   Component Value Date/Time    Valproic acid 74 03/07/2022 05:43 AM       Hepatic Function  Lab Results   Component Value Date/Time    Bilirubin, total 1.7 (H) 02/28/2022 08:58 PM    Protein, total 7.6 02/28/2022 08:58 PM    Albumin 4.2 02/28/2022 08:58 PM    Globulin 3.4 02/28/2022 08:58 PM    A-G Ratio 1.2 02/28/2022 08:58 PM    ALT (SGPT) 25 02/28/2022 08:58 PM    AST (SGOT) 20 02/28/2022 08:58 PM    Alk. phosphatase 52 02/28/2022 08:58 PM     Hematology  Lab Results   Component Value Date/Time    WBC 7.6 02/28/2022 08:58 PM    RBC 3.94 (L) 02/28/2022 08:58 PM    HGB 12.1 02/28/2022 08:58 PM    HCT 37.5 02/28/2022 08:58 PM    MCV 95.2 02/28/2022 08:58 PM    MCH 30.7 02/28/2022 08:58 PM    MCHC 32.3 02/28/2022 08:58 PM    RDW 13.9 02/28/2022 08:58 PM    PLATELET 716 65/10/0318 08:58 PM     Assessment/Plan:  A valproic acid level was drawn on 3/7 @ 0543 and found to be 74 mcg/mL. This level was drawn after 4 days of therapy but not timed as a trough. Based on this level, recommend continuing current dose.       Jimmy Wall, PharmD, BCPP, [facility], [address] [place] [address] none

## 2022-03-07 NOTE — BH NOTES
GROUP THERAPY PROGRESS NOTE    Pt did not participate in community group.   CHUN Chang, [facility]

## 2022-03-07 NOTE — PROGRESS NOTES
Problem: Falls - Risk of  Goal: *Absence of Falls  Description: Document Frederick James Fall Risk and appropriate interventions in the flowsheet. Outcome: Progressing Towards Goal  Note: Fall Risk Interventions:            Medication Interventions: Teach patient to arise slowly    Patient received resting quietly in bed. No signs of distress. Even and unlabored breathing. Staff will continue to monitor safety Q15 and provide support.

## 2022-03-07 NOTE — DISCHARGE SUMMARY
PSYCHIATRIC DISCHARGE SUMMARY    Patient: Army Pascual MRN: [id number]  SSN: [id number]    YOB: 1990  Age: 28 y.o. Sex: male        Date of Admission: 2/28/2022  Date of Discharge:3/7/2022      Type of Discharge:  REGULAR     Admission data:     CHIEF COMPLAINT:  \"I want Klonopin. \"     HISTORY OF PRESENTING ILLNESS:  The patient is a 67-year-old male who was initially admitted at [address] on a temporary FDC order. He had his hearing this morning and was involuntary committed. He is notably manic during the interview. His speech is pressured and thought process is disorganized. He is also inappropriately laughing. He states that he was at [place], was homeless there and then his brother picked him up and brought him back to [place] and then they had an argument. He states that he was also discharged [facility] in [place] for being late on his appointment, but he states that he was not really late. He states that he was prescribed Depakote, Klonopin. He states that he stopped taking the Depakote 2 weeks ago because it made him drowsy. He has a history of bipolar I disorder and states that he was previously admitted at formerly [facility] and other facilities. Reportedly, the patient was brought into the emergency room by the EMS after he endorsed suicidal and then homicidal ideations at [address] [facility]. Upon his arrival in the emergency room, he had a verbal altercation with one of the staff about his bike and backpack. He reported that he was in the emergency room to get his Klonopin. He tells me that he has homicidal ideation towards people. He states that he is being followed by police officers and law enforcement officers. [place] Sh[place] are bad people. \"  His urine drug screen is negative. He is focus on klonopin. He is currently on probation for DUI. He has an ankle monitor. He denies any suicidal ideation. He is admitted to the inpatient psychiatric unit for further stabilization and treatment.     PAST MEDICAL HISTORY:  See H and P.          Past Medical History:   Diagnosis Date    ADHD (attention deficit hyperactivity disorder)           Labs: (reviewed/updated 3/5/2022)  Patient Vitals for the past 8 hrs:    BP Temp Pulse Resp SpO2   03/04/22 1953 130/79 97.2 °F (36.2 °C) 94 16 100 %            Labs Reviewed   CBC WITH AUTOMATED DIFF - Abnormal; Notable for the following components:       Result Value      RBC 3.94 (*)       All other components within normal limits   METABOLIC PANEL, COMPREHENSIVE - Abnormal; Notable for the following components:     Anion gap 4 (*)       Bilirubin, total 1.7 (*)       All other components within normal limits   ACETAMINOPHEN - Abnormal; Notable for the following components:     Acetaminophen level <2 (*)       All other components within normal limits   SALICYLATE - Abnormal; Notable for the following components:     Salicylate level <2.8 (*)       All other components within normal limits   URINALYSIS W/MICROSCOPIC - Abnormal; Notable for the following components:     Ketone TRACE (*)       Leukocyte Esterase TRACE (*)       All other components within normal limits   COVID-19 WITH INFLUENZA A/B   URINE CULTURE HOLD SAMPLE   ETHYL ALCOHOL   DRUG SCREEN, URINE            Lab Results   Component Value Date/Time     Sodium 137 02/28/2022 08:58 PM     Potassium 3.9 02/28/2022 08:58 PM     Chloride 107 02/28/2022 08:58 PM     CO2 26 02/28/2022 08:58 PM     Anion gap 4 (L) 02/28/2022 08:58 PM     Glucose 87 02/28/2022 08:58 PM     BUN 15 02/28/2022 08:58 PM     Creatinine 1.10 02/28/2022 08:58 PM     BUN/Creatinine ratio 14 02/28/2022 08:58 PM     GFR est AA >60 02/28/2022 08:58 PM     GFR est non-AA >60 02/28/2022 08:58 PM     Calcium 9.5 02/28/2022 08:58 PM     Bilirubin, total 1.7 (H) 02/28/2022 08:58 PM     Alk.  phosphatase 52 02/28/2022 08:58 PM     Protein, total 7.6 02/28/2022 08:58 PM     Albumin 4.2 02/28/2022 08:58 PM     Globulin 3.4 02/28/2022 08:58 PM     A-G Ratio 1.2 02/28/2022 08:58 PM     ALT (SGPT) 25 02/28/2022 08:58 PM              Admission on 02/28/2022   Component Date Value Ref Range Status    WBC 02/28/2022 7.6  4.1 - 11.1 K/uL Final    RBC 02/28/2022 3.94* 4.10 - 5.70 M/uL Final    HGB 02/28/2022 12.1  12.1 - 17.0 g/dL Final    HCT 02/28/2022 37.5  36.6 - 50.3 % Final    MCV 02/28/2022 95.2  80.0 - 99.0 FL Final    MCH 02/28/2022 30.7  26.0 - 34.0 PG Final    MCHC 02/28/2022 32.3  30.0 - 36.5 g/dL Final    RDW 02/28/2022 13.9  11.5 - 14.5 % Final    PLATELET 40/48/2368 358  150 - 400 K/uL Final    MPV 02/28/2022 9.4  8.9 - 12.9 FL Final    NRBC 02/28/2022 0.0  0  WBC Final    ABSOLUTE NRBC 02/28/2022 0.00  0.00 - 0.01 K/uL Final    NEUTROPHILS 02/28/2022 54  32 - 75 % Final    LYMPHOCYTES 02/28/2022 29  12 - 49 % Final    MONOCYTES 02/28/2022 10  5 - 13 % Final    EOSINOPHILS 02/28/2022 6  0 - 7 % Final    BASOPHILS 02/28/2022 1  0 - 1 % Final    IMMATURE GRANULOCYTES 02/28/2022 0  0.0 - 0.5 % Final    ABS. NEUTROPHILS 02/28/2022 4.1  1.8 - 8.0 K/UL Final    ABS. LYMPHOCYTES 02/28/2022 2.2  0.8 - 3.5 K/UL Final    ABS. MONOCYTES 02/28/2022 0.8  0.0 - 1.0 K/UL Final    ABS. EOSINOPHILS 02/28/2022 0.4  0.0 - 0.4 K/UL Final    ABS. BASOPHILS 02/28/2022 0.0  0.0 - 0.1 K/UL Final    ABS. IMM.  GRANS. 02/28/2022 0.0  0.00 - 0.04 K/UL Final    DF 02/28/2022 AUTOMATED    Final    Sodium 02/28/2022 137  136 - 145 mmol/L Final    Potassium 02/28/2022 3.9  3.5 - 5.1 mmol/L Final    Chloride 02/28/2022 107  97 - 108 mmol/L Final    CO2 02/28/2022 26  21 - 32 mmol/L Final    Anion gap 02/28/2022 4* 5 - 15 mmol/L Final    Glucose 02/28/2022 87  65 - 100 mg/dL Final    BUN 02/28/2022 15  6 - 20 MG/DL Final    Creatinine 02/28/2022 1.10  0.70 - 1.30 MG/DL Final    BUN/Creatinine ratio 02/28/2022 14  12 - 20   Final    GFR est AA 02/28/2022 >60  >60 ml/min/1.73m2 Final    GFR est non-AA 02/28/2022 >60  >60 ml/min/1.73m2 Final    Calcium 02/28/2022 9.5  8.5 - 10.1 MG/DL Final    Bilirubin, total 02/28/2022 1.7* 0.2 - 1.0 MG/DL Final    ALT (SGPT) 02/28/2022 25  12 - 78 U/L Final    AST (SGOT) 02/28/2022 20  15 - 37 U/L Final    Alk. phosphatase 02/28/2022 52  45 - 117 U/L Final    Protein, total 02/28/2022 7.6  6.4 - 8.2 g/dL Final    Albumin 02/28/2022 4.2  3.5 - 5.0 g/dL Final    Globulin 02/28/2022 3.4  2.0 - 4.0 g/dL Final    A-G Ratio 02/28/2022 1.2  1.1 - 2.2   Final    Acetaminophen level 02/28/2022 <2* 10 - 30 ug/mL Final    Salicylate level 40/17/9660 <1.7* 2.8 - 20.0 MG/DL Final    ALCOHOL(ETHYL),SERUM 02/28/2022 <10  <10 MG/DL Final    SARS-CoV-2 02/28/2022 Not detected  NOTD   Final    Influenza A by PCR 02/28/2022 Not detected  NOTD   Final    Influenza B by PCR 02/28/2022 Not detected  NOTD   Final    Color 03/01/2022 YELLOW/STRAW    Final    Appearance 03/01/2022 CLEAR  CLEAR   Final    Specific gravity 03/01/2022 1.029  1.003 - 1.030   Final    pH (UA) 03/01/2022 6.5  5.0 - 8.0   Final    Protein 03/01/2022 Negative  NEG mg/dL Final    Glucose 03/01/2022 Negative  NEG mg/dL Final    Ketone 03/01/2022 TRACE* NEG mg/dL Final    Bilirubin 03/01/2022 Negative  NEG   Final    Blood 03/01/2022 Negative  NEG   Final    Urobilinogen 03/01/2022 1.0  0.2 - 1.0 EU/dL Final    Nitrites 03/01/2022 Negative  NEG   Final    Leukocyte Esterase 03/01/2022 TRACE* NEG   Final    WBC 03/01/2022 0-4  0 - 4 /hpf Final    RBC 03/01/2022 0-5  0 - 5 /hpf Final    Epithelial cells 03/01/2022 FEW  FEW /lpf Final    Bacteria 03/01/2022 Negative  NEG /hpf Final    Hyaline cast 03/01/2022 0-2  0 - 5 /lpf Final    Urine culture hold 03/01/2022 Urine on hold in Microbiology dept for 2 days. If unpreserved urine is submitted, it cannot be used for addtional testing after 24 hours, recollection will be required.     Final    AMPHETAMINES 03/01/2022 Negative  NEG   Final    BARBITURATES 03/01/2022 Negative  NEG   Final    BENZODIAZEPINES 03/01/2022 Negative  NEG   Final    COCAINE 03/01/2022 Negative  NEG   Final    METHADONE 03/01/2022 Negative  NEG   Final    OPIATES 03/01/2022 Negative  NEG   Final    PCP(PHENCYCLIDINE) 03/01/2022 Negative  NEG   Final    THC (TH-CANNABINOL) 03/01/2022 Negative  NEG   Final    Drug screen comment 03/01/2022 (NOTE)    Final             Vitals:     03/03/22 1707 03/03/22 2047 03/04/22 0707 03/04/22 1953   BP: 125/66 118/69 118/69 130/79   Pulse: 72 78 62 94   Resp: 16 16 16 16   Temp: 98.1 °F (36.7 °C) 98.3 °F (36.8 °C) 97.4 °F (36.3 °C) 97.2 °F (36.2 °C)   SpO2: 100% 99% 100% 100%   Weight:           Height:              Recent Results   No results found for this or any previous visit (from the past 24 hour(s)).        RADIOLOGY REPORTS:  No results found for this or any previous visit. No results found.                    PAST PSYCHIATRIC HISTORY:  See above.     PSYCHOSOCIAL HISTORY:  He reports that he is single. He has no children. He finished high school. He is homeless. He is currently on probation for DUI. He has an ankle monitor.     MENTAL STATUS EXAMINATION:  He is currently sitting up in chair, dressed in hospital apparel. Mood is elevated. Affect is expansive. Speech is pressured. Thought process is disorganized. He denies suicidal ideation, but he is endorsing homicidal ideation as described above. He denies any hallucinations, but paranoia is noteworthy. Memory is impaired. Intelligence is below average. Insight is poor. Judgment is poor.     DIAGNOSIS:  Bipolar I disorder, current episode manic with psychotic features. Hospital Course:    Patient was admitted to the Psychiatric services for acute psychiatric stabilization in regards to symptomatology as described in the HPI above and placed on Q15 minute checks and suicide precautions.  Standing medications were ordered. He was started on on risperdal and depakote. While on the unit Gaetano Ladd was involved in individual, group, occupational and milieu therapy. He improved gradually and was able to integrate into the milieu with help from the nursing staff. Patients symptoms improved gradually including si, worsening mood, depression, anxiety, poor sleep. He was appropriate in his interactions, and cooperative with medications and the unit routine. Please see individual progress notes for more specific details regarding patient's hospitalization course. Patient was discharged as per the plan. He had been doing well on the unit as per the report of the nursing staff and my observations. No PRN medication for agitation, seclusion or restraints were required during the last 48 hours of his stay. Gaetano Ladd had improved progressively to the point of being stable for discharge and outpatient FU. At this time he did not offer any complaints. Patient denied any SI or HI. Denied any AH or VH. He denied any delusions. Was not considered a danger to self or to others and is safe for discharge. Will FU with his appointments and remains motivated to be in treatment. The patient verbalized understanding of his discharge instructions. Allergies:(reviewed/updated 3/7/2022)  No Known Allergies    Side Effects: (reviewed/updated 3/7/2022)  None reported or admitted to.     Vital Signs:  Patient Vitals for the past 24 hrs:   Temp Pulse Resp BP SpO2   03/07/22 0758 98.1 °F (36.7 °C) 77 16 128/70 100 %     Wt Readings from Last 3 Encounters:   02/28/22 80 kg (176 lb 5.9 oz)   12/14/17 71.1 kg (156 lb 11.2 oz)   09/29/17 70.4 kg (155 lb 4.8 oz)     Temp Readings from Last 3 Encounters:   03/07/22 98.1 °F (36.7 °C)   12/14/17 98.3 °F (36.8 °C) (Oral)   09/29/17 98 °F (36.7 °C) (Oral)     BP Readings from Last 3 Encounters:   03/07/22 128/70   12/14/17 112/73   09/29/17 120/75     Pulse Readings from Last 3 Encounters: 03/07/22 77   12/14/17 73   09/29/17 80       Labs: (reviewed/updated 3/7/2022)  Recent Results (from the past 24 hour(s))   VALPROIC ACID    Collection Time: 03/07/22  5:43 AM   Result Value Ref Range    Valproic acid 74 50 - 100 ug/ml   LIPID PANEL    Collection Time: 03/07/22  5:43 AM   Result Value Ref Range    Cholesterol, total 140 <200 MG/DL    Triglyceride 95 <150 MG/DL    HDL Cholesterol 59 MG/DL    LDL, calculated 62 0 - 100 MG/DL    VLDL, calculated 19 MG/DL    CHOL/HDL Ratio 2.4 0.0 - 5.0       Lab Results   Component Value Date/Time    Valproic acid 74 03/07/2022 05:43 AM     No results found for: [facility] [employer]    Radiology (reviewed/updated 3/7/2022)  No results found. Mental Status Exam on Discharge:  General appearance:   Army Pascual is a 28 y.o. BLACK/ male who is well groomed, psychomotor activity is WNL  Eye contact: makes good eye contact  Speech: Spontaneous and coherent  Affect : Euthymic  Mood: \"OK\"  Thought Process: Logical, goal directed  Perception: Denies any AH or VH. Thought Content: Denies any SI or Plan  Insight: Partial  Judgement: Fair  Cognition: Intact grossly. Discharge Diagnosis:   Bipolar I disorder, current episode manic with psychotic features. Discharge Medication List as of 3/7/2022  1:27 PM      START taking these medications    Details   hydrOXYzine HCL (ATARAX) 50 mg tablet Take 1 Tablet by mouth every four (4) hours as needed for Anxiety. Indications: anxious, Normal, Disp-30 Tablet, R-0      risperiDONE (RisperDAL) 1 mg tablet Take 1 Tablet by mouth nightly. Indications: kaela associated with bipolar disorder, Normal, Disp-30 Tablet, R-0      traZODone (DESYREL) 50 mg tablet Take 1 Tablet by mouth nightly as needed for Sleep (For insomnia).  Indications: insomnia associated with depression, Normal, Disp-30 Tablet, R-0         CONTINUE these medications which have CHANGED    Details   divalproex ER (DEPAKOTE ER) 500 mg ER tablet Take 2 Tablets by mouth nightly. Indications: kaela associated with bipolar disorder, Normal, Disp-60 Tablet, R-0                  Follow-up Information     Follow up With Specialties Details Why Contact Info    Other, MD Ronen    Patient can only remember the practice name and not the physician      [address]   Follow-up for medication mgmt and counseling services [address]  [phone]        WOUND CARE: none needed. Prognosis:   Good / Watt Smolder based on nature of patient's pathology/ies and treatment compliance issues. Prognosis is greatly dependent upon patient's ability to  follow up on psychiatric/psychotherapy appointments as well as to comply with psychiatric medications as prescribed. I certify that this patients inpatient psychiatric hospital services furnished since the previous certification were, and continue to be, required for treatment that could reasonably be expected to improve the patient's condition, or for diagnostic study, and that the patient continues to need, on a daily basis, active treatment furnished directly by or requiring the supervision of inpatient psychiatric facility personnel. In addition, the hospital records show that services furnished were intensive treatment services, admission or related services, or equivalent services.      Signed:  Marita Briseno NP  3/7/2022

## 2022-03-07 NOTE — BH NOTES
PSYCHIATRIC PROGRESS NOTE       Patient: Niranjan Cain MRN: [id number]  SSN: [id number]    YOB: 1990  Age: 28 y.o. Sex: male      Admit Date: 2/28/2022    LOS: 5 days       Chief Complaint:  I am doing ok. Interval History:  Niranjan Cain says he is doing well. He is sleeping and eating well. He denies si hi or avh. He is calm and pleasant during the interview. Coherent thought process, less hyperverbal. He remains focused on disability but easily redirectable. Says he has court hearing monday for public intoxication in [place] that he cannot missed. Staff report patient can get entitled at times but no agitation or aggression. At the present time the patient Niranjan Cain remains compliant with taking medications. Denies any adverse events from taking them and feels they have been beneficial.  VA level tomorrow evening. Past Medical History:  Past Medical History:   Diagnosis Date    ADHD (attention deficit hyperactivity disorder)          ALLERGIES:(reviewed/updated 3/7/2022)  No Known Allergies    Laboratory report:  Lab Results   Component Value Date/Time    WBC 7.6 02/28/2022 08:58 PM    HGB 12.1 02/28/2022 08:58 PM    HCT 37.5 02/28/2022 08:58 PM    PLATELET 601 78/70/5595 08:58 PM    MCV 95.2 02/28/2022 08:58 PM      Lab Results   Component Value Date/Time    Sodium 137 02/28/2022 08:58 PM    Potassium 3.9 02/28/2022 08:58 PM    Chloride 107 02/28/2022 08:58 PM    CO2 26 02/28/2022 08:58 PM    Anion gap 4 (L) 02/28/2022 08:58 PM    Glucose 87 02/28/2022 08:58 PM    BUN 15 02/28/2022 08:58 PM    Creatinine 1.10 02/28/2022 08:58 PM    BUN/Creatinine ratio 14 02/28/2022 08:58 PM    GFR est AA >60 02/28/2022 08:58 PM    GFR est non-AA >60 02/28/2022 08:58 PM    Calcium 9.5 02/28/2022 08:58 PM    Bilirubin, total 1.7 (H) 02/28/2022 08:58 PM    Alk.  phosphatase 52 02/28/2022 08:58 PM    Protein, total 7.6 02/28/2022 08:58 PM    Albumin 4.2 02/28/2022 08:58 PM    Globulin 3.4 02/28/2022 08:58 PM    A-G Ratio 1.2 02/28/2022 08:58 PM    ALT (SGPT) 25 02/28/2022 08:58 PM      Vitals:    03/05/22 1639 03/05/22 1937 03/06/22 0804 03/07/22 0758   BP: (!) 142/101 118/81 126/82 128/70   Pulse: 75 82 62 77   Resp: 16 16 16 16   Temp: 97.5 °F (36.4 °C) 98.4 °F (36.9 °C) 97.7 °F (36.5 °C) 98.1 °F (36.7 °C)   SpO2: 100% 98% 100% 100%   Weight:       Height:           Lab Results   Component Value Date/Time    Valproic acid 74 03/07/2022 05:43 AM     No results found for: LITHM    Vital Signs  Patient Vitals for the past 24 hrs:   Temp Pulse Resp BP SpO2   03/07/22 0758 98.1 °F (36.7 °C) 77 16 128/70 100 %     Wt Readings from Last 3 Encounters:   02/28/22 80 kg (176 lb 5.9 oz)   12/14/17 71.1 kg (156 lb 11.2 oz)   09/29/17 70.4 kg (155 lb 4.8 oz)     Temp Readings from Last 3 Encounters:   03/07/22 98.1 °F (36.7 °C)   12/14/17 98.3 °F (36.8 °C) (Oral)   09/29/17 98 °F (36.7 °C) (Oral)     BP Readings from Last 3 Encounters:   03/07/22 128/70   12/14/17 112/73   09/29/17 120/75     Pulse Readings from Last 3 Encounters:   03/07/22 77   12/14/17 73   09/29/17 80       Radiology (reviewed/updated 3/7/2022)  No results found.     Current Facility-Administered Medications   Medication Dose Route Frequency Provider Last Rate Last Admin    risperiDONE (RisperDAL) tablet 1 mg  1 mg Oral QHS Queta Ivan, NP   1 mg at 03/06/22 2010    divalproex ER (DEPAKOTE ER) 24 hour tablet 1,000 mg  1,000 mg Oral QHS Queta Ivan NP   1,000 mg at 03/06/22 2010    hydrOXYzine HCL (ATARAX) tablet 50 mg  50 mg Oral Q4H PRN Queta Ivan NP   50 mg at 03/07/22 0827    OLANZapine (ZyPREXA) tablet 5 mg  5 mg Oral Q6H PRN Nikki Hamilton MD   5 mg at 03/07/22 0827    haloperidol lactate (HALDOL) injection 5 mg  5 mg IntraMUSCular Q6H PRN Nikki Hamilton MD        benztropine (COGENTIN) tablet 1 mg  1 mg Oral BID PRN Nikki Hamilton MD        diphenhydrAMINE (BENADRYL) injection 50 mg  50 mg IntraMUSCular BID PRN Umesh Huff MD        LORazepam (ATIVAN) injection 1 mg  1 mg IntraMUSCular Q4H PRN Umesh Huff MD        traZODone (DESYREL) tablet 50 mg  50 mg Oral QHS PRN Umesh Huff MD   50 mg at 03/06/22 2010    acetaminophen (TYLENOL) tablet 650 mg  650 mg Oral Q4H PRN Umesh Huff MD        magnesium hydroxide (MILK OF MAGNESIA) 400 mg/5 mL oral suspension 30 mL  30 mL Oral DAILY PRN Umesh Huff MD           Side Effects: (reviewed/updated 3/7/2022)  None reported or admitted to. Review of Systems: (reviewed/updated 3/7/2022)  Appetite: good  Sleep: good   All other Review of Systems: negative    Mental Status Exam:  Eye contact: good eye contact  Psychomotor activity: wnl  Speech is less pressured  Thought process: Logical  Mood is \"good\"  Affect: full  Perception: No avh  Suicidal ideation: No si  Homicidal ideation: No hi  Insight/judgment: Poor  Cognition is grossly intact      Physical Exam:  Musculoskeletal system: steady gait  Tremor not present  Cog wheeling not present      Assessment and Plan:  Ivory Duran meets criteria for a diagnosis of Bipolar I disorder, current episode manic with psychotic features. Va level Monday evening. Continue current medications as prescribed. We will closely monitor for safety. We will encourage reality orientation. Disposition planning to continue. I certify that this patients inpatient psychiatric hospital services furnished since the previous certification were, and continue to be, required for treatment that could reasonably be expected to improve the patient's condition, or for diagnostic study, and that the patient continues to need, on a daily basis, active treatment furnished directly by or requiring the supervision of inpatient psychiatric facility personnel. In addition, the hospital records show that services furnished were intensive treatment services, admission or related services, or equivalent services. Signed:  Theresa Patricio NP

## 2022-03-07 NOTE — DISCHARGE INSTRUCTIONS
DISCHARGE SUMMARY    NAME:Ryan Martinez  : 1990  MRN: [id number]    The patient Kari Cortes exhibits the ability to control behavior in a less restrictive environment. Patient's level of functioning is improving. No assaultive/destructive behavior has been observed for the past 24 hours. No suicidal/homicidal threat or behavior has been observed for the past 24 hours. There is no evidence of serious medication side effects. Patient has not been in physical or protective restraints for at least the past 24 hours. If weapons involved, how are they secured? NA    Is patient aware of and in agreement with discharge plan? yes    Arrangements for medication:  Prescriptions filled at [address] [place] [address] Ave of discharge instructions to provider?:  CSB    Arrangements for transportation home:  Pt has his bicycle    Keep all follow up appointments as scheduled, continue to take prescribed medications per physician instructions. Mental health crisis numbers:  024 or [facility] ([place] [address]) Crisis: [phone] or [place] [address] Alexei Russell Crisis: [phone]. Mental Health Emergency WARM LINE      [phone] ([phone])      M-F: 9am to 9pm      Sat & Sun: 5pm - 9pm  National suicide prevention lines:                             5-664-CJTZZBJ ([phone])       3-632-708-TALK ([phone])    Crisis Text Line:  Text HOME to ROGERS Mo 9 from Nurse    PATIENT INSTRUCTIONS:    What to do at Home:  Recommended activity: Activity as tolerated,     If you experience any of the following symptoms, thoughts of hurting yourself or someone else, feelings of helplessness or hopelessness, or need assistance caring for yourself, please follow up with calling *911 or [facility] ([place] [address]) Crisis: [phone] or [place], [address] [place] [address] Crisis: [phone]. *  Please give a list of your current medications to your Primary Care Provider.     *  Please update this list whenever your medications are discontinued, doses are      changed, or new medications (including over-the-counter products) are added. *  Please carry medication information at all times in case of emergency situations. These are general instructions for a healthy lifestyle:    No smoking/ No tobacco products/ Avoid exposure to second hand smoke  Surgeon General's Warning:  Quitting smoking now greatly reduces serious risk to your health. Obesity, smoking, and sedentary lifestyle greatly increases your risk for illness    A healthy diet, regular physical exercise & weight monitoring are important for maintaining a healthy lifestyle    You may be retaining fluid if you have a history of heart failure or if you experience any of the following symptoms:  Weight gain of 3 pounds or more overnight or 5 pounds in a week, increased swelling in our hands or feet or shortness of breath while lying flat in bed. Please call your doctor as soon as you notice any of these symptoms; do not wait until your next office visit. The discharge information has been reviewed with the patient. The patient verbalized understanding. Discharge medications reviewed with the patient and appropriate educational materials and side effects teaching were provided.   ___________________________________________________________________________________________________________________________________

## 2022-03-07 NOTE — BH NOTES
Behavioral Health Transition Record to Provider    Patient Name: Law Vang  YOB: 1990  Medical Record Number: [id number]  Date of Admission: 2/28/2022  Date of Discharge: 3/7/2022    Attending Provider: No att. providers found  Discharging Provider: Ying Mclean NP  To contact this individual call [phone] and ask the  to page. If unavailable, ask to be transferred to [address] [place] [address] Provider on call. [facility] Provider will be available on call 24/7 and during holidays. Primary Care Provider: Other, MD Ronen    No Known Allergies    Reason for Admission: The patient is a 70-year-old male who was initially admitted at [address] on a temporary [facility] order. He had his hearing this morning and was involuntary committed. He is notably manic during the interview. His speech is pressured and thought process is disorganized. He is also inappropriately laughing. He states that he was at [place], was homeless there and then his brother picked him up and brought him back to [place] and then they had an argument. He states that he was also discharged from a [employer] in [place] for being late on his appointment, but he states that he was not really late. He states that he was prescribed Depakote, Klonopin. He states that he stopped taking the Depakote 2 weeks ago because it made him drowsy. He has a history of bipolar I disorder and states that he was previously admitted at [facility] and other facilities. Reportedly, the patient was brought into the emergency room by the EMS after he endorsed suicidal and then homicidal ideations at [facility]. Upon his arrival in the emergency room, he had a verbal altercation with one of the staff about his bike and backpack. He reported that he was in the emergency room to get his Klonopin.   He tells me that he has homicidal ideation towards people. He states that he is being followed by police officers and law enforcement officers. Cris Santana are bad people. \"  His urine drug screen is negative. He is focus on klonopin. He is currently on probation for DUI. He has an ankle monitor. He denies any suicidal ideation. He is admitted to the inpatient psychiatric unit for further stabilization and treatment. Admission Diagnosis: Psychosis ([address] [F29]    * No surgery found *    Results for orders placed or performed during the hospital encounter of 02/28/22   COVID-19 WITH INFLUENZA A/B   Result Value Ref Range    SARS-CoV-2 Not detected NOTD      Influenza A by PCR Not detected NOTD      Influenza B by PCR Not detected NOTD     URINE CULTURE HOLD SAMPLE    Specimen: Serum; Urine   Result Value Ref Range    Urine culture hold        Urine on hold in Microbiology dept for 2 days. If unpreserved urine is submitted, it cannot be used for addtional testing after 24 hours, recollection will be required. CBC WITH AUTOMATED DIFF   Result Value Ref Range    WBC 7.6 4.1 - 11.1 K/uL    RBC 3.94 (L) 4.10 - 5.70 M/uL    HGB 12.1 12.1 - 17.0 g/dL    HCT 37.5 36.6 - 50.3 %    MCV 95.2 80.0 - 99.0 FL    MCH 30.7 26.0 - 34.0 PG    MCHC 32.3 30.0 - 36.5 g/dL    RDW 13.9 11.5 - 14.5 %    PLATELET 714 224 - 005 K/uL    MPV 9.4 8.9 - 12.9 FL    NRBC 0.0 0  WBC    ABSOLUTE NRBC 0.00 0.00 - 0.01 K/uL    NEUTROPHILS 54 32 - 75 %    LYMPHOCYTES 29 12 - 49 %    MONOCYTES 10 5 - 13 %    EOSINOPHILS 6 0 - 7 %    BASOPHILS 1 0 - 1 %    IMMATURE GRANULOCYTES 0 0.0 - 0.5 %    ABS. NEUTROPHILS 4.1 1.8 - 8.0 K/UL    ABS. LYMPHOCYTES 2.2 0.8 - 3.5 K/UL    ABS. MONOCYTES 0.8 0.0 - 1.0 K/UL    ABS. EOSINOPHILS 0.4 0.0 - 0.4 K/UL    ABS. BASOPHILS 0.0 0.0 - 0.1 K/UL    ABS. IMM.  GRANS. 0.0 0.00 - 0.04 K/UL    DF AUTOMATED     METABOLIC PANEL, COMPREHENSIVE   Result Value Ref Range    Sodium 137 136 - 145 mmol/L    Potassium 3.9 3.5 - 5.1 mmol/L    Chloride 107 97 - 108 mmol/L CO2 26 21 - 32 mmol/L    Anion gap 4 (L) 5 - 15 mmol/L    Glucose 87 65 - 100 mg/dL    BUN 15 6 - 20 MG/DL    Creatinine 1.10 0.70 - 1.30 MG/DL    BUN/Creatinine ratio 14 12 - 20      GFR est AA >60 >60 ml/min/1.73m2    GFR est non-AA >60 >60 ml/min/1.73m2    Calcium 9.5 8.5 - 10.1 MG/DL    Bilirubin, total 1.7 (H) 0.2 - 1.0 MG/DL    ALT (SGPT) 25 12 - 78 U/L    AST (SGOT) 20 15 - 37 U/L    Alk.  phosphatase 52 45 - 117 U/L    Protein, total 7.6 6.4 - 8.2 g/dL    Albumin 4.2 3.5 - 5.0 g/dL    Globulin 3.4 2.0 - 4.0 g/dL    A-G Ratio 1.2 1.1 - 2.2     ACETAMINOPHEN   Result Value Ref Range    Acetaminophen level <2 (L) 10 - 30 ug/mL   SALICYLATE   Result Value Ref Range    Salicylate level <6.6 (L) 2.8 - 20.0 MG/DL   ETHYL ALCOHOL   Result Value Ref Range    ALCOHOL(ETHYL),SERUM <10 <10 MG/DL   URINALYSIS W/MICROSCOPIC   Result Value Ref Range    Color YELLOW/STRAW      Appearance CLEAR CLEAR      Specific gravity 1.029 1.003 - 1.030      pH (UA) 6.5 5.0 - 8.0      Protein Negative NEG mg/dL    Glucose Negative NEG mg/dL    Ketone TRACE (A) NEG mg/dL    Bilirubin Negative NEG      Blood Negative NEG      Urobilinogen 1.0 0.2 - 1.0 EU/dL    Nitrites Negative NEG      Leukocyte Esterase TRACE (A) NEG      WBC 0-4 0 - 4 /hpf    RBC 0-5 0 - 5 /hpf    Epithelial cells FEW FEW /lpf    Bacteria Negative NEG /hpf    Hyaline cast 0-2 0 - 5 /lpf   DRUG SCREEN, URINE   Result Value Ref Range    AMPHETAMINES Negative NEG      BARBITURATES Negative NEG      BENZODIAZEPINES Negative NEG      COCAINE Negative NEG      METHADONE Negative NEG      OPIATES Negative NEG      PCP(PHENCYCLIDINE) Negative NEG      THC (TH-CANNABINOL) Negative NEG      Drug screen comment (NOTE)    VALPROIC ACID   Result Value Ref Range    Valproic acid 74 50 - 100 ug/ml   LIPID PANEL   Result Value Ref Range    Cholesterol, total 140 <200 MG/DL    Triglyceride 95 <150 MG/DL    HDL Cholesterol 59 MG/DL    LDL, calculated 62 0 - 100 MG/DL    VLDL, calculated 19 MG/DL    CHOL/HDL Ratio 2.4 0.0 - 5.0         Immunizations administered during this encounter: There is no immunization history on file for this patient. Screening for Metabolic Disorders for Patients on Antipsychotic Medications  (Data obtained from the EMR)    Estimated Body Mass Index  Estimated body mass index is 27.62 kg/m² as calculated from the following:    Height as of this encounter: 5' 7\" (1.702 m). Weight as of this encounter: 80 kg (176 lb 5.9 oz). Vital Signs/Blood Pressure  Visit Vitals  /70 (BP 1 Location: Left upper arm, BP Patient Position: Sitting)   Pulse 77   Temp 98.1 °F (36.7 °C)   Resp 16   Ht 5' 7\" (1.702 m)   Wt 80 kg (176 lb 5.9 oz)   SpO2 100%   BMI 27.62 kg/m²       Blood Glucose/Hemoglobin A1c  Lab Results   Component Value Date/Time    Glucose 87 02/28/2022 08:58 PM       No results found for: HBA1C, JTB3BIHK, [id number]     Lipid Panel  Lab Results   Component Value Date/Time    Cholesterol, total 140 03/07/2022 05:43 AM    HDL Cholesterol 59 03/07/2022 05:43 AM    LDL, calculated 62 03/07/2022 05:43 AM    Triglyceride 95 03/07/2022 05:43 AM    CHOL/HDL Ratio 2.4 03/07/2022 05:43 AM        Discharge Diagnosis: Bipolar I disorder, current episode manic with psychotic features. Discharge Plan: The patient Law Vang exhibits the ability to control behavior in a less restrictive environment. Patient's level of functioning is improving. No assaultive/destructive behavior has been observed for the past 24 hours. No suicidal/homicidal threat or behavior has been observed for the past 24 hours. There is no evidence of serious medication side effects. Patient has not been in physical or protective restraints for at least the past 24 hours. If weapons involved, how are they secured?  NA    Is patient aware of and in agreement with discharge plan? yes    Arrangements for medication:  Prescriptions filled at [address] of discharge instructions to provider?:  CSB    Arrangements for transportation home:  Pt has his bicycle    Keep all follow up appointments as scheduled, continue to take prescribed medications per physician instructions. Mental health crisis numbers:  428 or [facility] Crisis: [phone] or [place], [place] Crisis: [phone]. Mental Health Emergency WARM LINE      [phone] ([phone])      M-F: 9am to 9pm      Sat & Sun: 5pm - 9pm  National suicide prevention lines:                             6-043-GSKXSRT ([phone])       5-165-532-TALK ([phone])   24/7 Crisis Text Line:  Text HOME to 115321      Discharge Medication List and Instructions:   Discharge Medication List as of 3/7/2022  1:27 PM      START taking these medications    Details   hydrOXYzine HCL (ATARAX) 50 mg tablet Take 1 Tablet by mouth every four (4) hours as needed for Anxiety. Indications: anxious, Normal, Disp-30 Tablet, R-0      risperiDONE (RisperDAL) 1 mg tablet Take 1 Tablet by mouth nightly. Indications: kaela associated with bipolar disorder, Normal, Disp-30 Tablet, R-0      traZODone (DESYREL) 50 mg tablet Take 1 Tablet by mouth nightly as needed for Sleep (For insomnia). Indications: insomnia associated with depression, Normal, Disp-30 Tablet, R-0         CONTINUE these medications which have CHANGED    Details   divalproex ER (DEPAKOTE ER) 500 mg ER tablet Take 2 Tablets by mouth nightly.  Indications: kaela associated with bipolar disorder, Normal, Disp-60 Tablet, R-0             Unresulted Labs (24h ago, onward)            None        To obtain results of studies pending at discharge, please contact [phone]    Follow-up Information     Follow up With Specialties Details Why Contact Info    Other, MD Ronen    Patient can only remember the practice name and not the physician      [address]   Follow-up for medication mgmt and counseling services [address] [facility] [address]  [phone]          Advanced Directive:   Does the patient have an appointed surrogate decision maker? No  Does the patient have a Medical Advance Directive? No  Does the patient have a Psychiatric Advance Directive? No  If the patient does not have a surrogate or Medical Advance Directive AND Psychiatric Advance Directive, the patient was offered information on these advance directives Patient declined to complete    Patient Instructions: Please continue all medications until otherwise directed by physician. Tobacco Cessation Discharge Plan:   Is the patient a smoker and needs referral for smoking cessation? No  Patient referred to the following for smoking cessation with an appointment? No     Patient was offered medication to assist with smoking cessation at discharge? No  Was education for smoking cessation added to the discharge instructions? Yes    Alcohol/Substance Abuse Discharge Plan:   Does the patient have a history of substance/alcohol abuse and requires a referral for treatment? No  Patient referred to the following for substance/alcohol abuse treatment with an appointment? No  Patient was offered medication to assist with alcohol cessation at discharge? No  Was education for substance/alcohol abuse added to discharge instructions? No    Patient discharged to Home; discussed with patient/caregiver and provided to the patient/caregiver either in hard copy or electronically.

## 2022-03-07 NOTE — PROGRESS NOTES
Laboratory Monitoring for Antipsychotics: This patient is currently prescribed the following medication(s):   Current Facility-Administered Medications   Medication Dose Route Frequency    risperiDONE (RisperDAL) tablet 1 mg  1 mg Oral QHS    divalproex ER (DEPAKOTE ER) 24 hour tablet 1,000 mg  1,000 mg Oral QHS     The following labs have been completed for monitoring of antipsychotics and/or mood stabilizers:    Height, Weight, BMI Estimation  Estimated body mass index is 27.62 kg/m² as calculated from the following:    Height as of this encounter: 170.2 cm (67\"). Weight as of this encounter: 80 kg (176 lb 5.9 oz). Vital Signs/Blood Pressure  Visit Vitals  /70 (BP 1 Location: Left upper arm, BP Patient Position: Sitting)   Pulse 77   Temp 98.1 °F (36.7 °C)   Resp 16   Ht 170.2 cm (67\")   Wt 80 kg (176 lb 5.9 oz)   SpO2 100%   BMI 27.62 kg/m²     Renal Function, Hepatic Function and Chemistry  Estimated Creatinine Clearance: 99.6 mL/min (by C-G formula based on SCr of 1.1 mg/dL). Lab Results   Component Value Date/Time    Sodium 137 02/28/2022 08:58 PM    Potassium 3.9 02/28/2022 08:58 PM    Chloride 107 02/28/2022 08:58 PM    CO2 26 02/28/2022 08:58 PM    Anion gap 4 (L) 02/28/2022 08:58 PM    BUN 15 02/28/2022 08:58 PM    Creatinine 1.10 02/28/2022 08:58 PM    BUN/Creatinine ratio 14 02/28/2022 08:58 PM    Bilirubin, total 1.7 (H) 02/28/2022 08:58 PM    Protein, total 7.6 02/28/2022 08:58 PM    Albumin 4.2 02/28/2022 08:58 PM    Globulin 3.4 02/28/2022 08:58 PM    A-G Ratio 1.2 02/28/2022 08:58 PM    ALT (SGPT) 25 02/28/2022 08:58 PM    AST (SGOT) 20 02/28/2022 08:58 PM    Alk.  phosphatase 52 02/28/2022 08:58 PM     Lab Results   Component Value Date/Time    Glucose 87 02/28/2022 08:58 PM     No results found for: HBA1C, [id number]    Hematology  Lab Results   Component Value Date/Time    WBC 7.6 02/28/2022 08:58 PM    RBC 3.94 (L) 02/28/2022 08:58 PM    HGB 12.1 02/28/2022 08:58 PM    HCT 37.5 02/28/2022 08:58 PM    MCV 95.2 02/28/2022 08:58 PM    MCH 30.7 02/28/2022 08:58 PM    MCHC 32.3 02/28/2022 08:58 PM    RDW 13.9 02/28/2022 08:58 PM    PLATELET 704 31/09/5610 08:58 PM     Lipids  Lab Results   Component Value Date/Time    Cholesterol, total 163 09/29/2017 02:49 PM    HDL Cholesterol 72 09/29/2017 02:49 PM    LDL, calculated 82 09/29/2017 02:49 PM    Triglyceride 46 09/29/2017 02:49 PM     Thyroid Function  No results found for: TSH, TSH2, TSH3, TSHP, TSHELE, TT3, T3U, T3UP, FRT3, FT3, FT4, FT4P, T4, T4P, FT4T, TT7, TSHEXT    Assessment/Plan:  Will order lipid panel and hemoglobin A1c or fasting glucose to complete the recommended baseline laboratory monitoring based on the patient's current medication regimen.         Mable Camp, LELED

## 2022-03-09 ENCOUNTER — HOSPITAL ENCOUNTER (EMERGENCY)
Age: 32
Discharge: HOME | End: 2022-03-09
Attending: EMERGENCY MEDICINE | Admitting: EMERGENCY MEDICINE
Payer: MEDICAID

## 2022-03-09 VITALS
TEMPERATURE: 96.8 F | OXYGEN SATURATION: 100 % | DIASTOLIC BLOOD PRESSURE: 72 MMHG | HEART RATE: 91 BPM | RESPIRATION RATE: 16 BRPM | BODY MASS INDEX: 23.99 KG/M2 | WEIGHT: 162 LBS | HEIGHT: 69 IN | SYSTOLIC BLOOD PRESSURE: 120 MMHG

## 2022-03-09 DIAGNOSIS — F99 MENTAL HEALTH DISORDER: Primary | ICD-10-CM

## 2022-03-09 LAB
ALBUMIN SERPL-MCNC: 4.4 G/DL (ref 3.5–5)
ALBUMIN/GLOB SERPL: 1.3 {RATIO} (ref 1.1–2.2)
ALP SERPL-CCNC: 56 U/L (ref 45–117)
ALT SERPL-CCNC: 27 U/L (ref 12–78)
AMPHET UR QL SCN: NEGATIVE
ANION GAP SERPL CALC-SCNC: 6 MMOL/L (ref 5–15)
APPEARANCE UR: CLEAR
AST SERPL-CCNC: 62 U/L (ref 15–37)
BACTERIA URNS QL MICRO: NEGATIVE /HPF
BARBITURATES UR QL SCN: NEGATIVE
BASOPHILS # BLD: 0.1 K/UL (ref 0–0.1)
BASOPHILS NFR BLD: 1 % (ref 0–1)
BENZODIAZ UR QL: NEGATIVE
BILIRUB SERPL-MCNC: 0.9 MG/DL (ref 0.2–1)
BILIRUB UR QL: NEGATIVE
BUN SERPL-MCNC: 19 MG/DL (ref 6–20)
BUN/CREAT SERPL: 16 (ref 12–20)
CALCIUM SERPL-MCNC: 8.9 MG/DL (ref 8.5–10.1)
CANNABINOIDS UR QL SCN: NEGATIVE
CHLORIDE SERPL-SCNC: 110 MMOL/L (ref 97–108)
CO2 SERPL-SCNC: 24 MMOL/L (ref 21–32)
COCAINE UR QL SCN: NEGATIVE
COLOR UR: ABNORMAL
CREAT SERPL-MCNC: 1.22 MG/DL (ref 0.7–1.3)
DIFFERENTIAL METHOD BLD: ABNORMAL
DRUG SCRN COMMENT,DRGCM: NORMAL
EOSINOPHIL # BLD: 0.7 K/UL (ref 0–0.4)
EOSINOPHIL NFR BLD: 7 % (ref 0–7)
EPITH CASTS URNS QL MICRO: ABNORMAL /LPF
ERYTHROCYTE [DISTWIDTH] IN BLOOD BY AUTOMATED COUNT: 13.8 % (ref 11.5–14.5)
ETHANOL SERPL-MCNC: <10 MG/DL
FLUAV RNA SPEC QL NAA+PROBE: NOT DETECTED
FLUBV RNA SPEC QL NAA+PROBE: NOT DETECTED
GLOBULIN SER CALC-MCNC: 3.3 G/DL (ref 2–4)
GLUCOSE SERPL-MCNC: 93 MG/DL (ref 65–100)
GLUCOSE UR STRIP.AUTO-MCNC: NEGATIVE MG/DL
HCT VFR BLD AUTO: 36.3 % (ref 36.6–50.3)
HGB BLD-MCNC: 12.1 G/DL (ref 12.1–17)
HGB UR QL STRIP: NEGATIVE
HYALINE CASTS URNS QL MICRO: ABNORMAL /LPF (ref 0–5)
IMM GRANULOCYTES # BLD AUTO: 0 K/UL (ref 0–0.04)
IMM GRANULOCYTES NFR BLD AUTO: 0 % (ref 0–0.5)
KETONES UR QL STRIP.AUTO: ABNORMAL MG/DL
LEUKOCYTE ESTERASE UR QL STRIP.AUTO: NEGATIVE
LYMPHOCYTES # BLD: 2.9 K/UL (ref 0.8–3.5)
LYMPHOCYTES NFR BLD: 29 % (ref 12–49)
MCH RBC QN AUTO: 31.2 PG (ref 26–34)
MCHC RBC AUTO-ENTMCNC: 33.3 G/DL (ref 30–36.5)
MCV RBC AUTO: 93.6 FL (ref 80–99)
METHADONE UR QL: NEGATIVE
MONOCYTES # BLD: 1.1 K/UL (ref 0–1)
MONOCYTES NFR BLD: 11 % (ref 5–13)
NEUTS SEG # BLD: 5.3 K/UL (ref 1.8–8)
NEUTS SEG NFR BLD: 52 % (ref 32–75)
NITRITE UR QL STRIP.AUTO: NEGATIVE
NRBC # BLD: 0 K/UL (ref 0–0.01)
NRBC BLD-RTO: 0 PER 100 WBC
OPIATES UR QL: NEGATIVE
PCP UR QL: NEGATIVE
PH UR STRIP: 6 [PH] (ref 5–8)
PLATELET # BLD AUTO: 303 K/UL (ref 150–400)
PMV BLD AUTO: 9.5 FL (ref 8.9–12.9)
POTASSIUM SERPL-SCNC: 3.6 MMOL/L (ref 3.5–5.1)
PROT SERPL-MCNC: 7.7 G/DL (ref 6.4–8.2)
PROT UR STRIP-MCNC: NEGATIVE MG/DL
RBC # BLD AUTO: 3.88 M/UL (ref 4.1–5.7)
RBC #/AREA URNS HPF: ABNORMAL /HPF (ref 0–5)
SALICYLATES SERPL-MCNC: 1.9 MG/DL (ref 2.8–20)
SARS-COV-2, COV2: NOT DETECTED
SODIUM SERPL-SCNC: 140 MMOL/L (ref 136–145)
SP GR UR REFRACTOMETRY: 1.02 (ref 1–1.03)
UROBILINOGEN UR QL STRIP.AUTO: 0.2 EU/DL (ref 0.2–1)
WBC # BLD AUTO: 10 K/UL (ref 4.1–11.1)
WBC URNS QL MICRO: ABNORMAL /HPF (ref 0–4)

## 2022-03-09 PROCEDURE — 80053 COMPREHEN METABOLIC PANEL: CPT

## 2022-03-09 PROCEDURE — 85025 COMPLETE CBC W/AUTO DIFF WBC: CPT

## 2022-03-09 PROCEDURE — 87636 SARSCOV2 & INF A&B AMP PRB: CPT

## 2022-03-09 PROCEDURE — 81001 URINALYSIS AUTO W/SCOPE: CPT

## 2022-03-09 PROCEDURE — 99283 EMERGENCY DEPT VISIT LOW MDM: CPT

## 2022-03-09 PROCEDURE — 82077 ASSAY SPEC XCP UR&BREATH IA: CPT

## 2022-03-09 PROCEDURE — 36415 COLL VENOUS BLD VENIPUNCTURE: CPT

## 2022-03-09 PROCEDURE — 80307 DRUG TEST PRSMV CHEM ANLYZR: CPT

## 2022-03-09 PROCEDURE — 80179 DRUG ASSAY SALICYLATE: CPT

## 2022-03-09 RX ORDER — ACETAMINOPHEN 325 MG/1
325 TABLET ORAL
Status: DISCONTINUED | OUTPATIENT
Start: 2022-03-09 | End: 2022-03-09

## 2022-03-09 NOTE — ED NOTES
MD reviewed discharge instructions and options with patient and patient verbalized understanding. RN reviewed discharge instructions using teachback method. Pt ambulated to exit without difficulty and in no signs of acute distress escorted by security, and he  will drive home. No complaints or needs expressed at this time. Patient was counseled on medications prescribed at discharge. VSS, verbalized relief from most intense pain. Patient to call Par[facility] Cano in the morning for appointment.
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
## 2022-03-09 NOTE — ED TRIAGE NOTES
Patient arrives via EMS for HI. Pt reports hearing voices. Pt states \"these voices are getting louder. I cannot tell you what they're saying because i'll end up seeing the  again\". Vital stables in route. A&O x4. Hx Bipolar     Pt refusing to put on gown. Increasingly verbally aggressive when explained policy and procedure.

## 2022-03-09 NOTE — ED PROVIDER NOTES
62-year-old male history of ADHD, bipolar recently admitted to the mental health unit, presenting to the ED for hallucinations. Patient reports that he has been hearing voices and they are getting louder. On asked with her time to do, states that he does not with to tell me because any will have to go in front of the  again. Patient is somewhat avoidant of questions, no additional history able to be obtained at this time. Denies any fever or recent illness. PMHx: as above  PSx: denies           Past Medical History:   Diagnosis Date    ADHD (attention deficit hyperactivity disorder)        No past surgical history on file.       Family History:   Problem Relation Age of Onset    Diabetes Maternal Grandmother        Social History     Socioeconomic History    Marital status: SINGLE     Spouse name: Not on file    Number of children: Not on file    Years of education: Not on file    Highest education level: Not on file   Occupational History    Not on file   Tobacco Use    Smoking status: Former Smoker     Packs/day: 0.50     Years: 1.00     Pack years: 0.50    Smokeless tobacco: Never Used   Substance and Sexual Activity    Alcohol use: No    Drug use: No    Sexual activity: Yes     Partners: Female   Other Topics Concern     Service Not Asked    Blood Transfusions Not Asked    Caffeine Concern Not Asked    Occupational Exposure Not Asked    Hobby Hazards Not Asked    Sleep Concern Not Asked    Stress Concern Not Asked    Weight Concern Not Asked    Special Diet Not Asked    Back Care Not Asked    Exercise Not Asked    Bike Helmet Not Asked    Seat Belt Not Asked    Self-Exams Not Asked   Social History Narrative    Not on file     Social Determinants of Health     Financial Resource Strain:     Difficulty of Paying Living Expenses: Not on file   Food Insecurity:     Worried About Running Out of Food in the Last Year: Not on file    [address] in the Last Year: Not on file   Transportation Needs:     Lack of Transportation (Medical): Not on file    Lack of Transportation (Non-Medical): Not on file   Physical Activity:     Days of Exercise per Week: Not on file    Minutes of Exercise per Session: Not on file   Stress:     Feeling of Stress : Not on file   Social Connections:     Frequency of Communication with Friends and Family: Not on file    Frequency of Social Gatherings with Friends and Family: Not on file    Attends Muslim Services: Not on file    Active Member of [address] [place] [address] or Organizations: Not on file    Attends Club or Organization Meetings: Not on file    Marital Status: Not on file   Intimate Partner Violence:     Fear of Current or Ex-Partner: Not on file    Emotionally Abused: Not on file    Physically Abused: Not on file    Sexually Abused: Not on file   Housing Stability:     Unable to Pay for Housing in the Last Year: Not on file    Number of [place] in the Last Year: Not on file    Unstable Housing in the Last Year: Not on file         ALLERGIES: Patient has no known allergies. Review of Systems   Unable to perform ROS: Psychiatric disorder       Vitals:    03/09/22 0223   BP: 120/72   Pulse: 91   Resp: 16   Temp: 96.8 °F (36 °C)   SpO2: 100%   Weight: 73.5 kg (162 lb)   Height: 5' 9\" (1.753 m)            Physical Exam  Vitals and nursing note reviewed. Constitutional:       General: He is not in acute distress. Appearance: He is well-developed. Comments: Cooperative, no distress, poor eye contact   HENT:      Head: Normocephalic and atraumatic. Right Ear: External ear normal.      Left Ear: External ear normal.   Eyes:      General: No scleral icterus. Conjunctiva/sclera: Conjunctivae normal.   Neck:      Trachea: No tracheal deviation. Cardiovascular:      Rate and Rhythm: Normal rate and regular rhythm. Heart sounds: Normal heart sounds. No murmur heard. No friction rub. No gallop.     Pulmonary: Effort: Pulmonary effort is normal. No respiratory distress. Breath sounds: Normal breath sounds. No stridor. No wheezing. Abdominal:      General: There is no distension. Palpations: Abdomen is soft. Musculoskeletal:         General: Normal range of motion. Cervical back: Neck supple. Skin:     General: Skin is warm and dry. Neurological:      Mental Status: He is alert and oriented to person, place, and time. MDM  Number of Diagnoses or Management Options  Diagnosis management comments: 26-year-old male, history of ADHD, bipolar presenting to the ED for hallucinations. Recently discharged from mental health unit. Will have patient evaluated by counselor in the ED.        Amount and/or Complexity of Data Reviewed  Discuss the patient with other providers: yes (Dr. Sofy Calles ED attending)           Procedures

## 2022-03-09 NOTE — ED NOTES
Pt refusing to change into [facility] gown. Refusing to have belongings secured. Security called to wand patient for weapons.

## 2022-03-09 NOTE — DISCHARGE INSTRUCTIONS
Thank you for allowing us to provide you with medical care today. We realize that you have many choices for your emergency care needs. We thank you for choosing [facility] [place]. Please choose us in the future for any continued health care needs. The exam and treatment you received in the Emergency Department were for an emergent problem and are not intended as complete care. It is important that you follow up with a doctor, nurse practitioner, or physician's assistant for ongoing care. If your symptoms worsen or you do not improve as expected and you are unable to reach your usual health care provider, you should return to the Emergency Department. We are available 24 hours a day. Please make an appointment with your health care provider(s) for follow up of your Emergency Department visit. Take this sheet with you when you go to your follow-up visit.

## 2022-03-18 PROBLEM — F29 PSYCHOSIS (HCC): Status: ACTIVE | Noted: 2022-03-02

## 2022-03-18 PROBLEM — K52.9 GASTROENTERITIS: Status: ACTIVE | Noted: 2017-12-16

## 2022-03-19 PROBLEM — T14.8XXA BRUISE: Status: ACTIVE | Noted: 2017-12-16

## 2022-03-21 ENCOUNTER — HOSPITAL ENCOUNTER (EMERGENCY)
Age: 32
Discharge: HOME | End: 2022-03-21
Attending: EMERGENCY MEDICINE | Admitting: EMERGENCY MEDICINE
Payer: MEDICAID

## 2022-03-21 ENCOUNTER — APPOINTMENT (OUTPATIENT)
Dept: GENERAL RADIOLOGY | Age: 32
End: 2022-03-21
Attending: EMERGENCY MEDICINE
Payer: MEDICAID

## 2022-03-21 VITALS
TEMPERATURE: 97.9 F | RESPIRATION RATE: 15 BRPM | HEIGHT: 69 IN | BODY MASS INDEX: 23.7 KG/M2 | SYSTOLIC BLOOD PRESSURE: 155 MMHG | WEIGHT: 160 LBS | DIASTOLIC BLOOD PRESSURE: 85 MMHG | OXYGEN SATURATION: 99 % | HEART RATE: 74 BPM

## 2022-03-21 DIAGNOSIS — S40.011A CONTUSION OF RIGHT SHOULDER, INITIAL ENCOUNTER: ICD-10-CM

## 2022-03-21 DIAGNOSIS — M54.50 ACUTE MIDLINE LOW BACK PAIN WITHOUT SCIATICA: Primary | ICD-10-CM

## 2022-03-21 PROCEDURE — 72100 X-RAY EXAM L-S SPINE 2/3 VWS: CPT

## 2022-03-21 PROCEDURE — 99283 EMERGENCY DEPT VISIT LOW MDM: CPT

## 2022-03-21 PROCEDURE — 74011250637 HC RX REV CODE- 250/637: Performed by: EMERGENCY MEDICINE

## 2022-03-21 RX ORDER — IBUPROFEN 600 MG/1
600 TABLET ORAL
Qty: 30 TABLET | Refills: 0 | Status: SHIPPED | OUTPATIENT
Start: 2022-03-21

## 2022-03-21 RX ORDER — CLONAZEPAM 0.5 MG/1
0.25 TABLET ORAL
COMMUNITY

## 2022-03-21 RX ORDER — IBUPROFEN 600 MG/1
600 TABLET ORAL
Status: COMPLETED | OUTPATIENT
Start: 2022-03-21 | End: 2022-03-21

## 2022-03-21 RX ADMIN — IBUPROFEN 600 MG: 600 TABLET ORAL at 14:14
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
## 2022-03-21 NOTE — DISCHARGE INSTRUCTIONS
Thank you for allowing us to provide you with medical care today. We realize that you have many choices for your emergency care needs. We thank you for choosing [facility].  Please choose us in the future for any continued health care needs. We hope we addressed all of your medical concerns. We strive to provide excellent quality care in the Emergency Department. Anything less than excellent does not meet our expectations. The exam and treatment you received in the Emergency Department were for an emergent problem and are not intended as complete care. It is important that you follow up with a doctor, nurse practitioner, or  [id number] assistant for ongoing care. If your symptoms worsen or you do not improve as expected and you are unable to reach your usual health care provider, you should return to the Emergency Department. We are available 24 hours a day. Take this sheet with you when you go to your follow-up visit. If you have any problem arranging the follow-up visit, contact the Emergency Department immediately. Make an appointment your family doctor for follow up of this visit. Return to the ER if you are unable to be seen in a timely manner.
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
## 2023-05-20 RX ORDER — DIVALPROEX SODIUM 500 MG/1
1000 TABLET, EXTENDED RELEASE ORAL
COMMUNITY
Start: 2022-03-07

## 2023-05-20 RX ORDER — TRAZODONE HYDROCHLORIDE 50 MG/1
50 TABLET ORAL
COMMUNITY
Start: 2022-03-07

## 2023-05-20 RX ORDER — CLONAZEPAM 0.5 MG/1
0.25 TABLET ORAL
COMMUNITY

## 2023-05-20 RX ORDER — HYDROXYZINE 50 MG/1
50 TABLET, FILM COATED ORAL EVERY 4 HOURS PRN
COMMUNITY
Start: 2022-03-07

## 2023-05-20 RX ORDER — RISPERIDONE 1 MG/1
1 TABLET ORAL
COMMUNITY
Start: 2022-03-07

## 2023-05-20 RX ORDER — IBUPROFEN 600 MG/1
600 TABLET ORAL EVERY 8 HOURS PRN
COMMUNITY
Start: 2022-03-21